# Patient Record
Sex: FEMALE | Race: WHITE | NOT HISPANIC OR LATINO | Employment: OTHER | URBAN - NONMETROPOLITAN AREA
[De-identification: names, ages, dates, MRNs, and addresses within clinical notes are randomized per-mention and may not be internally consistent; named-entity substitution may affect disease eponyms.]

---

## 2017-09-21 ENCOUNTER — TRANSCRIBE ORDERS (OUTPATIENT)
Dept: ADMINISTRATIVE | Facility: HOSPITAL | Age: 62
End: 2017-09-21

## 2017-09-21 DIAGNOSIS — N63.0 LUMP OR MASS IN BREAST: Primary | ICD-10-CM

## 2017-09-25 ENCOUNTER — HOSPITAL ENCOUNTER (OUTPATIENT)
Dept: ULTRASOUND IMAGING | Facility: HOSPITAL | Age: 62
Discharge: HOME | End: 2017-09-25

## 2017-09-25 ENCOUNTER — HOSPITAL ENCOUNTER (OUTPATIENT)
Dept: MAMMOGRAPHY | Facility: HOSPITAL | Age: 62
Discharge: HOME | End: 2017-09-25
Admitting: NURSE PRACTITIONER

## 2017-09-25 DIAGNOSIS — N63.0 LUMP OR MASS IN BREAST: ICD-10-CM

## 2017-09-25 PROCEDURE — 77061 BREAST TOMOSYNTHESIS UNI: CPT | Performed by: RADIOLOGY

## 2017-09-25 PROCEDURE — 76642 ULTRASOUND BREAST LIMITED: CPT

## 2017-09-25 PROCEDURE — G0204 DX MAMMO INCL CAD BI: HCPCS

## 2017-09-25 PROCEDURE — G0279 TOMOSYNTHESIS, MAMMO: HCPCS

## 2017-09-25 PROCEDURE — 76642 ULTRASOUND BREAST LIMITED: CPT | Performed by: RADIOLOGY

## 2017-09-25 PROCEDURE — G0206 DX MAMMO INCL CAD UNI: HCPCS

## 2017-09-25 PROCEDURE — 77065 DX MAMMO INCL CAD UNI: CPT | Performed by: RADIOLOGY

## 2019-03-07 ENCOUNTER — NURSE TRIAGE (OUTPATIENT)
Dept: CALL CENTER | Facility: HOSPITAL | Age: 64
End: 2019-03-07

## 2019-03-07 ENCOUNTER — HOSPITAL ENCOUNTER (EMERGENCY)
Facility: HOSPITAL | Age: 64
Discharge: HOME | End: 2019-03-07
Attending: EMERGENCY MEDICINE | Admitting: EMERGENCY MEDICINE

## 2019-03-07 DIAGNOSIS — G44.209 ACUTE NON INTRACTABLE TENSION-TYPE HEADACHE: ICD-10-CM

## 2019-03-07 DIAGNOSIS — K86.1 OTHER CHRONIC PANCREATITIS (HCC): Primary | ICD-10-CM

## 2019-03-07 LAB
ALBUMIN SERPL-MCNC: 4.4 G/DL (ref 3.4–4.8)
ALBUMIN/GLOB SERPL: 1.6 G/DL (ref 1.5–2.5)
ALP SERPL-CCNC: 78 U/L (ref 35–104)
ALT SERPL W P-5'-P-CCNC: 17 U/L (ref 10–36)
ANION GAP SERPL CALCULATED.3IONS-SCNC: 5.7 MMOL/L (ref 3.6–11.2)
AST SERPL-CCNC: 26 U/L (ref 10–30)
BACTERIA UR QL AUTO: ABNORMAL /HPF
BASOPHILS # BLD AUTO: 0.05 10*3/MM3 (ref 0–0.3)
BASOPHILS NFR BLD AUTO: 0.8 % (ref 0–2)
BILIRUB SERPL-MCNC: 0.5 MG/DL (ref 0.2–1.8)
BILIRUB UR QL STRIP: NEGATIVE
BUN BLD-MCNC: 10 MG/DL (ref 7–21)
BUN/CREAT SERPL: 8.9 (ref 7–25)
CALCIUM SPEC-SCNC: 9.3 MG/DL (ref 7.7–10)
CHLORIDE SERPL-SCNC: 109 MMOL/L (ref 99–112)
CLARITY UR: CLEAR
CO2 SERPL-SCNC: 22.3 MMOL/L (ref 24.3–31.9)
COLOR UR: YELLOW
CREAT BLD-MCNC: 1.12 MG/DL (ref 0.43–1.29)
DEPRECATED RDW RBC AUTO: 42.4 FL (ref 37–54)
EOSINOPHIL # BLD AUTO: 0.36 10*3/MM3 (ref 0–0.7)
EOSINOPHIL NFR BLD AUTO: 5.5 % (ref 0–5)
ERYTHROCYTE [DISTWIDTH] IN BLOOD BY AUTOMATED COUNT: 12.8 % (ref 11.5–14.5)
GFR SERPL CREATININE-BSD FRML MDRD: 49 ML/MIN/1.73
GLOBULIN UR ELPH-MCNC: 2.7 GM/DL
GLUCOSE BLD-MCNC: 86 MG/DL (ref 70–110)
GLUCOSE UR STRIP-MCNC: NEGATIVE MG/DL
HCT VFR BLD AUTO: 41.6 % (ref 37–47)
HGB BLD-MCNC: 14 G/DL (ref 12–16)
HGB UR QL STRIP.AUTO: ABNORMAL
HOLD SPECIMEN: NORMAL
HOLD SPECIMEN: NORMAL
HYALINE CASTS UR QL AUTO: ABNORMAL /LPF
IMM GRANULOCYTES # BLD AUTO: 0.01 10*3/MM3 (ref 0–0.03)
IMM GRANULOCYTES NFR BLD AUTO: 0.2 % (ref 0–0.5)
KETONES UR QL STRIP: NEGATIVE
LEUKOCYTE ESTERASE UR QL STRIP.AUTO: NEGATIVE
LIPASE SERPL-CCNC: 66 U/L (ref 13–60)
LYMPHOCYTES # BLD AUTO: 2.64 10*3/MM3 (ref 1–3)
LYMPHOCYTES NFR BLD AUTO: 40.4 % (ref 21–51)
MCH RBC QN AUTO: 30.7 PG (ref 27–33)
MCHC RBC AUTO-ENTMCNC: 33.7 G/DL (ref 33–37)
MCV RBC AUTO: 91.2 FL (ref 80–94)
MONOCYTES # BLD AUTO: 0.4 10*3/MM3 (ref 0.1–0.9)
MONOCYTES NFR BLD AUTO: 6.1 % (ref 0–10)
NEUTROPHILS # BLD AUTO: 3.08 10*3/MM3 (ref 1.4–6.5)
NEUTROPHILS NFR BLD AUTO: 47 % (ref 30–70)
NITRITE UR QL STRIP: NEGATIVE
OSMOLALITY SERPL CALC.SUM OF ELEC: 272.2 MOSM/KG (ref 273–305)
PH UR STRIP.AUTO: 6.5 [PH] (ref 5–8)
PLATELET # BLD AUTO: 294 10*3/MM3 (ref 130–400)
PMV BLD AUTO: 10.5 FL (ref 6–10)
POTASSIUM BLD-SCNC: 4.7 MMOL/L (ref 3.5–5.3)
PROT SERPL-MCNC: 7.1 G/DL (ref 6–8)
PROT UR QL STRIP: NEGATIVE
RBC # BLD AUTO: 4.56 10*6/MM3 (ref 4.2–5.4)
RBC # UR: ABNORMAL /HPF
REF LAB TEST METHOD: ABNORMAL
SODIUM BLD-SCNC: 137 MMOL/L (ref 135–153)
SP GR UR STRIP: <=1.005 (ref 1–1.03)
SQUAMOUS #/AREA URNS HPF: ABNORMAL /HPF
UROBILINOGEN UR QL STRIP: ABNORMAL
WBC NRBC COR # BLD: 6.54 10*3/MM3 (ref 4.5–12.5)
WBC UR QL AUTO: ABNORMAL /HPF
WHOLE BLOOD HOLD SPECIMEN: NORMAL
WHOLE BLOOD HOLD SPECIMEN: NORMAL

## 2019-03-07 PROCEDURE — 36415 COLL VENOUS BLD VENIPUNCTURE: CPT

## 2019-03-07 PROCEDURE — 96361 HYDRATE IV INFUSION ADD-ON: CPT

## 2019-03-07 PROCEDURE — 99283 EMERGENCY DEPT VISIT LOW MDM: CPT

## 2019-03-07 PROCEDURE — 80053 COMPREHEN METABOLIC PANEL: CPT | Performed by: EMERGENCY MEDICINE

## 2019-03-07 PROCEDURE — 96375 TX/PRO/DX INJ NEW DRUG ADDON: CPT

## 2019-03-07 PROCEDURE — 81001 URINALYSIS AUTO W/SCOPE: CPT | Performed by: EMERGENCY MEDICINE

## 2019-03-07 PROCEDURE — 25010000002 PROCHLORPERAZINE EDISYLATE PER 10 MG: Performed by: EMERGENCY MEDICINE

## 2019-03-07 PROCEDURE — 96374 THER/PROPH/DIAG INJ IV PUSH: CPT

## 2019-03-07 PROCEDURE — 83690 ASSAY OF LIPASE: CPT | Performed by: EMERGENCY MEDICINE

## 2019-03-07 PROCEDURE — 85025 COMPLETE CBC W/AUTO DIFF WBC: CPT | Performed by: EMERGENCY MEDICINE

## 2019-03-07 PROCEDURE — 25010000002 BUTORPHANOL PER 1 MG: Performed by: EMERGENCY MEDICINE

## 2019-03-07 RX ORDER — SODIUM CHLORIDE 0.9 % (FLUSH) 0.9 %
10 SYRINGE (ML) INJECTION AS NEEDED
Status: DISCONTINUED | OUTPATIENT
Start: 2019-03-07 | End: 2019-03-07 | Stop reason: HOSPADM

## 2019-03-07 RX ORDER — OXYCODONE HYDROCHLORIDE AND ACETAMINOPHEN 5; 325 MG/1; MG/1
1 TABLET ORAL ONCE
Status: COMPLETED | OUTPATIENT
Start: 2019-03-07 | End: 2019-03-07

## 2019-03-07 RX ADMIN — OXYCODONE HYDROCHLORIDE AND ACETAMINOPHEN 1 TABLET: 5; 325 TABLET ORAL at 15:15

## 2019-03-07 RX ADMIN — PROCHLORPERAZINE EDISYLATE 10 MG: 5 INJECTION INTRAMUSCULAR; INTRAVENOUS at 14:05

## 2019-03-07 RX ADMIN — BUTORPHANOL TARTRATE 1 MG: 2 INJECTION, SOLUTION INTRAMUSCULAR; INTRAVENOUS at 14:07

## 2019-03-07 RX ADMIN — SODIUM CHLORIDE 1000 ML: 9 INJECTION, SOLUTION INTRAVENOUS at 13:48

## 2019-03-07 NOTE — TELEPHONE ENCOUNTER
" Patient is having  Right temporal pain, blurring vision, but has cataracts, has not taken bp medication this morning, her bp is 175/103, does not want to go to the ED.  Has not taken her bp medication, advise to take medication and will call the patient back with in an hour 1050 called the patient back her bp now is 172/148, suggested she go to  The ED again, will now comply    Reason for Disposition  • [1] Systolic BP  >= 160 OR Diastolic >= 100 AND [2] cardiac or neurologic symptoms (e.g., chest pain, difficulty breathing, unsteady gait, blurred vision)    Additional Information  • Negative: Difficult to awaken or acting confused (e.g., disoriented, slurred speech)  • Negative: Severe difficulty breathing (e.g., struggling for each breath, speaks in single words)  • Negative: [1] Weakness of the face, arm or leg on one side of the body AND [2] new onset  • Negative: [1] Numbness (i.e., loss of sensation) of the face, arm or leg on one side of the body AND [2] new onset  • Negative: [1] Chest pain lasts > 5 minutes AND [2] history of heart disease  (i.e., heart attack, bypass surgery, angina, angioplasty, CHF)  • Negative: [1] Chest pain AND [2] took nitrogylcerin AND [3] pain was not relieved  • Negative: Sounds like a life-threatening emergency to the triager  • Negative: Symptom is main concern  (e.g., headache, chest pain)  • Negative: Low blood pressure is main concern  • Negative: [1] Pregnant > 20 weeks AND [2] new hand or face swelling  • Negative: [1] Pregnant > 20 weeks AND [2] BP Systolic BP  >= 140 OR Diastolic >= 90  • Negative: [1] Systolic BP  >= 200 OR Diastolic >= 120  AND [2] having NO cardiac or neurologic symptoms    Answer Assessment - Initial Assessment Questions  1. BLOOD PRESSURE: \"What is the blood pressure?\" \"Did you take at least two measurements 5 minutes apart?\"      175/103  2. ONSET: \"When did you take your blood pressure?\"     Just now at the time of the call  3. HOW: \"How did you " "obtain the blood pressure?\" (e.g., visiting nurse, automatic home BP monitor)      Automatic home bp  4. HISTORY: \"Do you have a history of high blood pressure?\"      yes  5. MEDICATIONS: \"Are you taking any medications for blood pressure?\" \"Have you missed any doses recently?\"      yes  6. OTHER SYMPTOMS: \"Do you have any symptoms?\" (e.g., headache, chest pain, blurred vision, difficulty breathing, weakness)      Blurred vision but has cataracts and has pain in temporal   7. PREGNANCY: \"Is there any chance you are pregnant?\" \"When was your last menstrual period?\"      na    Protocols used: HIGH BLOOD PRESSURE-ADULT-AH      "

## 2019-03-07 NOTE — ED PROVIDER NOTES
Subjective   Patient with history of chronic pancreatitis is been having increasing pain over the last few days.  She is now belching nausea vomiting and unable to keep her meds down.  She usually controls it with narcotics and antiemetics.  She is also developed a right-sided temporal headache sharp pain  is tender to touch.        History provided by:  Patient   used: No    Abdominal Pain   Pain location:  Epigastric  Pain quality: sharp and stabbing    Pain radiates to:  Back  Pain severity:  Severe  Onset quality:  Gradual  Duration:  2 days  Timing:  Constant  Progression:  Worsening  Chronicity:  Chronic  Context: recent illness    Relieved by:  Nothing  Worsened by:  Eating  Ineffective treatments: Pain meds and antiemetics.  Associated symptoms: nausea and vomiting    Associated symptoms: no chest pain, no dysuria and no fever    Associated symptoms comment:  Right-sided headache      Review of Systems   Constitutional: Negative.  Negative for fever.   HENT: Negative.    Respiratory: Negative.    Cardiovascular: Negative.  Negative for chest pain.   Gastrointestinal: Positive for abdominal pain, nausea and vomiting.   Endocrine: Negative.    Genitourinary: Negative.  Negative for dysuria.   Skin: Negative.    Neurological: Negative.    Psychiatric/Behavioral: Negative.    All other systems reviewed and are negative.      Past Medical History:   Diagnosis Date   • Breast cancer (CMS/[facility]) 2003       Allergies   Allergen Reactions   • Penicillins Anaphylaxis   • Sulfa Antibiotics Rash       Past Surgical History:   Procedure Laterality Date   • HYSTERECTOMY     • MASTECTOMY Left 2003       Family History   Problem Relation Age of Onset   • Breast cancer Mother    • Breast cancer Maternal Grandmother    • Breast cancer Maternal Aunt    • Breast cancer Paternal Aunt    • Breast cancer Maternal Aunt    • Breast cancer Maternal Aunt        Social History     Socioeconomic History   • Marital  status:      Spouse name: Not on file   • Number of children: Not on file   • Years of education: Not on file   • Highest education level: Not on file           Objective   Physical Exam   Constitutional: She is oriented to person, place, and time. She appears well-developed and well-nourished. No distress.   HENT:   Head: Normocephalic and atraumatic.       Right Ear: External ear normal.   Left Ear: External ear normal.   Nose: Nose normal.   Eyes: Conjunctivae and EOM are normal. Pupils are equal, round, and reactive to light.   Neck: Normal range of motion. Neck supple. No JVD present. No tracheal deviation present.   Cardiovascular: Normal rate, regular rhythm and normal heart sounds.   No murmur heard.  Pulmonary/Chest: Effort normal and breath sounds normal. No respiratory distress. She has no wheezes.   Abdominal: Soft. Bowel sounds are normal. There is tenderness in the epigastric area.   Musculoskeletal: Normal range of motion. She exhibits no edema or deformity.   Neurological: She is alert and oriented to person, place, and time. No cranial nerve deficit.   Skin: Skin is warm and dry. No rash noted. She is not diaphoretic. No erythema. No pallor.   Psychiatric: She has a normal mood and affect. Her behavior is normal. Thought content normal.   Nursing note and vitals reviewed.      Procedures           ED Course      Headache improved nausea gone still has low back pain.            MDM      Final diagnoses:   Other chronic pancreatitis (CMS/HCC)   Acute non intractable tension-type headache            Zeke Dunlap MD  03/07/19 3656

## 2019-03-09 VITALS
HEIGHT: 66 IN | TEMPERATURE: 97.3 F | RESPIRATION RATE: 18 BRPM | HEART RATE: 57 BPM | OXYGEN SATURATION: 99 % | DIASTOLIC BLOOD PRESSURE: 97 MMHG | SYSTOLIC BLOOD PRESSURE: 159 MMHG | BODY MASS INDEX: 30.53 KG/M2 | WEIGHT: 190 LBS

## 2019-04-16 ENCOUNTER — TRANSCRIBE ORDERS (OUTPATIENT)
Dept: ADMINISTRATIVE | Facility: HOSPITAL | Age: 64
End: 2019-04-16

## 2019-04-16 DIAGNOSIS — M47.816 LUMBAR SPONDYLOSIS: Primary | ICD-10-CM

## 2019-04-19 ENCOUNTER — HOSPITAL ENCOUNTER (OUTPATIENT)
Dept: MRI IMAGING | Facility: HOSPITAL | Age: 64
Discharge: HOME | End: 2019-04-19
Admitting: NURSE PRACTITIONER

## 2019-04-19 DIAGNOSIS — M47.816 LUMBAR SPONDYLOSIS: ICD-10-CM

## 2019-04-19 PROCEDURE — 72148 MRI LUMBAR SPINE W/O DYE: CPT

## 2019-04-19 PROCEDURE — 72148 MRI LUMBAR SPINE W/O DYE: CPT | Performed by: RADIOLOGY

## 2020-03-12 ENCOUNTER — APPOINTMENT (OUTPATIENT)
Dept: CT IMAGING | Facility: HOSPITAL | Age: 65
End: 2020-03-12

## 2020-03-12 ENCOUNTER — HOSPITAL ENCOUNTER (EMERGENCY)
Facility: HOSPITAL | Age: 65
Discharge: HOME | End: 2020-03-12
Attending: FAMILY MEDICINE | Admitting: FAMILY MEDICINE

## 2020-03-12 VITALS
SYSTOLIC BLOOD PRESSURE: 119 MMHG | OXYGEN SATURATION: 100 % | DIASTOLIC BLOOD PRESSURE: 66 MMHG | HEIGHT: 66 IN | HEART RATE: 66 BPM | BODY MASS INDEX: 28.77 KG/M2 | WEIGHT: 179 LBS | TEMPERATURE: 97.6 F | RESPIRATION RATE: 18 BRPM

## 2020-03-12 DIAGNOSIS — N39.0 UTI (URINARY TRACT INFECTION), UNCOMPLICATED: Primary | ICD-10-CM

## 2020-03-12 DIAGNOSIS — R10.84 GENERALIZED ABDOMINAL PAIN: ICD-10-CM

## 2020-03-12 DIAGNOSIS — R10.9 LEFT FLANK PAIN: ICD-10-CM

## 2020-03-12 LAB
ALBUMIN SERPL-MCNC: 4.55 G/DL (ref 3.5–5.2)
ALBUMIN/GLOB SERPL: 1.5 G/DL
ALP SERPL-CCNC: 101 U/L (ref 39–117)
ALT SERPL W P-5'-P-CCNC: 10 U/L (ref 1–33)
ANION GAP SERPL CALCULATED.3IONS-SCNC: 13.2 MMOL/L (ref 5–15)
AST SERPL-CCNC: 13 U/L (ref 1–32)
BACTERIA UR QL AUTO: ABNORMAL /HPF
BASOPHILS # BLD AUTO: 0.08 10*3/MM3 (ref 0–0.2)
BASOPHILS NFR BLD AUTO: 1 % (ref 0–1.5)
BILIRUB SERPL-MCNC: 0.2 MG/DL (ref 0.2–1.2)
BILIRUB UR QL STRIP: NEGATIVE
BUN BLD-MCNC: 12 MG/DL (ref 8–23)
BUN/CREAT SERPL: 11.5 (ref 7–25)
CALCIUM SPEC-SCNC: 9.4 MG/DL (ref 8.6–10.5)
CHLORIDE SERPL-SCNC: 101 MMOL/L (ref 98–107)
CLARITY UR: CLEAR
CO2 SERPL-SCNC: 27.8 MMOL/L (ref 22–29)
COLOR UR: YELLOW
CREAT BLD-MCNC: 1.04 MG/DL (ref 0.57–1)
CRP SERPL-MCNC: 0.56 MG/DL (ref 0–0.5)
DEPRECATED RDW RBC AUTO: 44.4 FL (ref 37–54)
EOSINOPHIL # BLD AUTO: 0.31 10*3/MM3 (ref 0–0.4)
EOSINOPHIL NFR BLD AUTO: 4 % (ref 0.3–6.2)
ERYTHROCYTE [DISTWIDTH] IN BLOOD BY AUTOMATED COUNT: 13 % (ref 12.3–15.4)
GFR SERPL CREATININE-BSD FRML MDRD: 53 ML/MIN/1.73
GLOBULIN UR ELPH-MCNC: 3 GM/DL
GLUCOSE BLD-MCNC: 107 MG/DL (ref 65–99)
GLUCOSE UR STRIP-MCNC: NEGATIVE MG/DL
HCT VFR BLD AUTO: 43.9 % (ref 34–46.6)
HGB BLD-MCNC: 14.2 G/DL (ref 12–15.9)
HGB UR QL STRIP.AUTO: ABNORMAL
HOLD SPECIMEN: NORMAL
HOLD SPECIMEN: NORMAL
HYALINE CASTS UR QL AUTO: ABNORMAL /LPF
IMM GRANULOCYTES # BLD AUTO: 0.02 10*3/MM3 (ref 0–0.05)
IMM GRANULOCYTES NFR BLD AUTO: 0.3 % (ref 0–0.5)
KETONES UR QL STRIP: NEGATIVE
LEUKOCYTE ESTERASE UR QL STRIP.AUTO: ABNORMAL
LIPASE SERPL-CCNC: 17 U/L (ref 13–60)
LYMPHOCYTES # BLD AUTO: 2.48 10*3/MM3 (ref 0.7–3.1)
LYMPHOCYTES NFR BLD AUTO: 32 % (ref 19.6–45.3)
MCH RBC QN AUTO: 30.3 PG (ref 26.6–33)
MCHC RBC AUTO-ENTMCNC: 32.3 G/DL (ref 31.5–35.7)
MCV RBC AUTO: 93.6 FL (ref 79–97)
MONOCYTES # BLD AUTO: 0.45 10*3/MM3 (ref 0.1–0.9)
MONOCYTES NFR BLD AUTO: 5.8 % (ref 5–12)
NEUTROPHILS # BLD AUTO: 4.4 10*3/MM3 (ref 1.7–7)
NEUTROPHILS NFR BLD AUTO: 56.9 % (ref 42.7–76)
NITRITE UR QL STRIP: NEGATIVE
NRBC BLD AUTO-RTO: 0 /100 WBC (ref 0–0.2)
PH UR STRIP.AUTO: 6.5 [PH] (ref 5–8)
PLATELET # BLD AUTO: 321 10*3/MM3 (ref 140–450)
PMV BLD AUTO: 9.8 FL (ref 6–12)
POTASSIUM BLD-SCNC: 3.8 MMOL/L (ref 3.5–5.2)
PROT SERPL-MCNC: 7.5 G/DL (ref 6–8.5)
PROT UR QL STRIP: NEGATIVE
RBC # BLD AUTO: 4.69 10*6/MM3 (ref 3.77–5.28)
RBC # UR: ABNORMAL /HPF
REF LAB TEST METHOD: ABNORMAL
SODIUM BLD-SCNC: 142 MMOL/L (ref 136–145)
SP GR UR STRIP: <=1.005 (ref 1–1.03)
SQUAMOUS #/AREA URNS HPF: ABNORMAL /HPF
UROBILINOGEN UR QL STRIP: ABNORMAL
WBC NRBC COR # BLD: 7.74 10*3/MM3 (ref 3.4–10.8)
WBC UR QL AUTO: ABNORMAL /HPF
WHOLE BLOOD HOLD SPECIMEN: NORMAL
WHOLE BLOOD HOLD SPECIMEN: NORMAL

## 2020-03-12 PROCEDURE — 85025 COMPLETE CBC W/AUTO DIFF WBC: CPT | Performed by: PHYSICIAN ASSISTANT

## 2020-03-12 PROCEDURE — 74178 CT ABD&PLV WO CNTR FLWD CNTR: CPT | Performed by: RADIOLOGY

## 2020-03-12 PROCEDURE — 86140 C-REACTIVE PROTEIN: CPT | Performed by: PHYSICIAN ASSISTANT

## 2020-03-12 PROCEDURE — 74178 CT ABD&PLV WO CNTR FLWD CNTR: CPT

## 2020-03-12 PROCEDURE — 87086 URINE CULTURE/COLONY COUNT: CPT | Performed by: PHYSICIAN ASSISTANT

## 2020-03-12 PROCEDURE — 25010000002 CEFTRIAXONE: Performed by: PHYSICIAN ASSISTANT

## 2020-03-12 PROCEDURE — 99283 EMERGENCY DEPT VISIT LOW MDM: CPT

## 2020-03-12 PROCEDURE — 80053 COMPREHEN METABOLIC PANEL: CPT | Performed by: PHYSICIAN ASSISTANT

## 2020-03-12 PROCEDURE — 25010000002 IOPAMIDOL 61 % SOLUTION: Performed by: FAMILY MEDICINE

## 2020-03-12 PROCEDURE — 25010000002 KETOROLAC TROMETHAMINE PER 15 MG: Performed by: FAMILY MEDICINE

## 2020-03-12 PROCEDURE — 25010000002 HYDROMORPHONE 1 MG/ML SOLUTION: Performed by: FAMILY MEDICINE

## 2020-03-12 PROCEDURE — 25010000002 HYDROMORPHONE PER 4 MG: Performed by: FAMILY MEDICINE

## 2020-03-12 PROCEDURE — 81001 URINALYSIS AUTO W/SCOPE: CPT | Performed by: PHYSICIAN ASSISTANT

## 2020-03-12 PROCEDURE — 25010000002 ONDANSETRON PER 1 MG: Performed by: FAMILY MEDICINE

## 2020-03-12 PROCEDURE — 96375 TX/PRO/DX INJ NEW DRUG ADDON: CPT

## 2020-03-12 PROCEDURE — 96376 TX/PRO/DX INJ SAME DRUG ADON: CPT

## 2020-03-12 PROCEDURE — 96365 THER/PROPH/DIAG IV INF INIT: CPT

## 2020-03-12 PROCEDURE — 83690 ASSAY OF LIPASE: CPT | Performed by: PHYSICIAN ASSISTANT

## 2020-03-12 RX ORDER — ONDANSETRON 2 MG/ML
4 INJECTION INTRAMUSCULAR; INTRAVENOUS ONCE
Status: COMPLETED | OUTPATIENT
Start: 2020-03-12 | End: 2020-03-12

## 2020-03-12 RX ORDER — HYDROMORPHONE HYDROCHLORIDE 1 MG/ML
0.5 INJECTION, SOLUTION INTRAMUSCULAR; INTRAVENOUS; SUBCUTANEOUS ONCE
Status: COMPLETED | OUTPATIENT
Start: 2020-03-12 | End: 2020-03-12

## 2020-03-12 RX ORDER — NITROFURANTOIN 25; 75 MG/1; MG/1
100 CAPSULE ORAL 2 TIMES DAILY
Qty: 14 CAPSULE | Refills: 0 | Status: ON HOLD | OUTPATIENT
Start: 2020-03-12 | End: 2021-12-20

## 2020-03-12 RX ORDER — SODIUM CHLORIDE 0.9 % (FLUSH) 0.9 %
10 SYRINGE (ML) INJECTION AS NEEDED
Status: DISCONTINUED | OUTPATIENT
Start: 2020-03-12 | End: 2020-03-12 | Stop reason: HOSPADM

## 2020-03-12 RX ORDER — KETOROLAC TROMETHAMINE 30 MG/ML
15 INJECTION, SOLUTION INTRAMUSCULAR; INTRAVENOUS ONCE
Status: COMPLETED | OUTPATIENT
Start: 2020-03-12 | End: 2020-03-12

## 2020-03-12 RX ADMIN — HYDROMORPHONE HYDROCHLORIDE 1 MG: 1 INJECTION, SOLUTION INTRAMUSCULAR; INTRAVENOUS; SUBCUTANEOUS at 16:23

## 2020-03-12 RX ADMIN — CEFTRIAXONE 1 G: 1 INJECTION, POWDER, FOR SOLUTION INTRAMUSCULAR; INTRAVENOUS at 15:57

## 2020-03-12 RX ADMIN — HYDROMORPHONE HYDROCHLORIDE 0.5 MG: 1 INJECTION, SOLUTION INTRAMUSCULAR; INTRAVENOUS; SUBCUTANEOUS at 14:39

## 2020-03-12 RX ADMIN — IOPAMIDOL 100 ML: 612 INJECTION, SOLUTION INTRAVENOUS at 15:46

## 2020-03-12 RX ADMIN — KETOROLAC TROMETHAMINE 15 MG: 30 INJECTION, SOLUTION INTRAMUSCULAR at 16:22

## 2020-03-12 RX ADMIN — SODIUM CHLORIDE 1000 ML: 9 INJECTION, SOLUTION INTRAVENOUS at 14:38

## 2020-03-12 RX ADMIN — ONDANSETRON 4 MG: 2 INJECTION INTRAMUSCULAR; INTRAVENOUS at 14:39

## 2020-03-12 NOTE — ED PROVIDER NOTES
Subjective   64-year-old female with past medical history of breast cancer and pancreatitis presents to the emergency room with abdominal pain for 1-1/2 weeks duration.  Patient states pain radiates to her left flank.  She does report nausea without vomiting and dysuria.  Denies fever, chest pain, fever, or hematuria.  Patient denies any alleviating factors despite taking Norco 10mg 3 times a day and tramadol.  Patient states aggravating factors include urinating and movement.  She does state that she has a history of pancreatitis, but denies history of kidney stones.  She reports that she has had multiple abdominal surgeries, she thinks approximately 17.  Patient reports she just got off a round of ciprofloxacin for urinary tract infection as prescribed by her PCP.      History provided by:  Patient   used: No        Review of Systems   Constitutional: Negative.  Negative for fever.   HENT: Negative.    Respiratory: Negative.    Cardiovascular: Negative.  Negative for chest pain.   Gastrointestinal: Positive for nausea. Negative for abdominal pain.   Endocrine: Negative.    Genitourinary: Positive for dysuria and flank pain.        Burning   Skin: Negative.    Neurological: Negative.    Psychiatric/Behavioral: Negative.    All other systems reviewed and are negative.      Past Medical History:   Diagnosis Date   • Breast cancer (CMS/[facility]) 2003       Allergies   Allergen Reactions   • Penicillins Anaphylaxis   • Sulfa Antibiotics Rash       Past Surgical History:   Procedure Laterality Date   • HYSTERECTOMY     • MASTECTOMY Left 2003       Family History   Problem Relation Age of Onset   • Breast cancer Mother    • Breast cancer Maternal Grandmother    • Breast cancer Maternal Aunt    • Breast cancer Paternal Aunt    • Breast cancer Maternal Aunt    • Breast cancer Maternal Aunt        Social History     Socioeconomic History   • Marital status:      Spouse name: Not on file   • Number of  children: Not on file   • Years of education: Not on file   • Highest education level: Not on file           Objective   Physical Exam   Constitutional: She is oriented to person, place, and time. She appears well-developed and well-nourished. No distress.   HENT:   Head: Normocephalic and atraumatic.   Right Ear: External ear normal.   Left Ear: External ear normal.   Nose: Nose normal.   Eyes: Pupils are equal, round, and reactive to light. Conjunctivae and EOM are normal.   Neck: Normal range of motion. Neck supple. No JVD present. No tracheal deviation present.   Cardiovascular: Normal rate, regular rhythm and normal heart sounds.   No murmur heard.  Pulmonary/Chest: Effort normal and breath sounds normal. No respiratory distress. She has no wheezes.   Abdominal: Soft. Bowel sounds are normal. There is generalized tenderness. There is CVA tenderness.   Left CVAT     Musculoskeletal: Normal range of motion. She exhibits no edema or deformity.   Neurological: She is alert and oriented to person, place, and time. No cranial nerve deficit.   Skin: Skin is warm and dry. No rash noted. She is not diaphoretic. No erythema. No pallor.   Psychiatric: She has a normal mood and affect. Her behavior is normal. Thought content normal.   Nursing note and vitals reviewed.      Procedures           ED Course  ED Course as of Mar 12 1656   Thu Mar 12, 2020   1615 IMPRESSION:   : No definitive source for the patient's symptoms.    This report was finalized on 3/12/2020 3:53 PM by Dr. Snow    CT Abdomen Pelvis With & Without Contrast [TK]      ED Course User Index  [TK] Aristides Stanley, ITZEL                                           MDM  Number of Diagnoses or Management Options  Generalized abdominal pain: new and requires workup  Left flank pain: new and requires workup  UTI (urinary tract infection), uncomplicated: new and requires workup     Amount and/or Complexity of Data Reviewed  Clinical lab tests: reviewed and  ordered  Tests in the radiology section of CPT®: reviewed and ordered    Risk of Complications, Morbidity, and/or Mortality  Presenting problems: moderate  Diagnostic procedures: moderate  Management options: moderate    Patient Progress  Patient progress: stable      Final diagnoses:   Generalized abdominal pain   Left flank pain   UTI (urinary tract infection), uncomplicated            Aristides Stanley, ITZEL  03/12/20 1651

## 2020-03-13 LAB — BACTERIA SPEC AEROBE CULT: NO GROWTH

## 2021-02-22 DIAGNOSIS — Z23 IMMUNIZATION DUE: ICD-10-CM

## 2021-02-24 ENCOUNTER — HOSPITAL ENCOUNTER (EMERGENCY)
Facility: HOSPITAL | Age: 66
Discharge: LEFT BEFORE DISCHARGE | End: 2021-02-24
Attending: FAMILY MEDICINE | Admitting: FAMILY MEDICINE

## 2021-02-24 VITALS
WEIGHT: 180 LBS | BODY MASS INDEX: 28.93 KG/M2 | DIASTOLIC BLOOD PRESSURE: 78 MMHG | OXYGEN SATURATION: 97 % | TEMPERATURE: 97 F | SYSTOLIC BLOOD PRESSURE: 127 MMHG | HEIGHT: 66 IN | HEART RATE: 77 BPM | RESPIRATION RATE: 18 BRPM

## 2021-02-24 DIAGNOSIS — R11.2 NAUSEA AND VOMITING, INTRACTABILITY OF VOMITING NOT SPECIFIED, UNSPECIFIED VOMITING TYPE: Primary | ICD-10-CM

## 2021-02-24 LAB
ALBUMIN SERPL-MCNC: 4.29 G/DL (ref 3.5–5.2)
ALBUMIN/GLOB SERPL: 1.5 G/DL
ALP SERPL-CCNC: 89 U/L (ref 39–117)
ALT SERPL W P-5'-P-CCNC: 12 U/L (ref 1–33)
AMYLASE SERPL-CCNC: 48 U/L (ref 28–100)
ANION GAP SERPL CALCULATED.3IONS-SCNC: 10.9 MMOL/L (ref 5–15)
AST SERPL-CCNC: 14 U/L (ref 1–32)
BASOPHILS # BLD AUTO: 0.11 10*3/MM3 (ref 0–0.2)
BASOPHILS NFR BLD AUTO: 1.3 % (ref 0–1.5)
BILIRUB SERPL-MCNC: 0.4 MG/DL (ref 0–1.2)
BUN SERPL-MCNC: 14 MG/DL (ref 8–23)
BUN/CREAT SERPL: 14.9 (ref 7–25)
CALCIUM SPEC-SCNC: 9.6 MG/DL (ref 8.6–10.5)
CHLORIDE SERPL-SCNC: 102 MMOL/L (ref 98–107)
CO2 SERPL-SCNC: 25.1 MMOL/L (ref 22–29)
CREAT SERPL-MCNC: 0.94 MG/DL (ref 0.57–1)
D-LACTATE SERPL-SCNC: 0.9 MMOL/L (ref 0.5–2)
DEPRECATED RDW RBC AUTO: 44.1 FL (ref 37–54)
EOSINOPHIL # BLD AUTO: 0.21 10*3/MM3 (ref 0–0.4)
EOSINOPHIL NFR BLD AUTO: 2.5 % (ref 0.3–6.2)
ERYTHROCYTE [DISTWIDTH] IN BLOOD BY AUTOMATED COUNT: 13 % (ref 12.3–15.4)
GFR SERPL CREATININE-BSD FRML MDRD: 60 ML/MIN/1.73
GLOBULIN UR ELPH-MCNC: 2.9 GM/DL
GLUCOSE SERPL-MCNC: 99 MG/DL (ref 65–99)
HCT VFR BLD AUTO: 39 % (ref 34–46.6)
HGB BLD-MCNC: 13.1 G/DL (ref 12–15.9)
HOLD SPECIMEN: NORMAL
HOLD SPECIMEN: NORMAL
IMM GRANULOCYTES # BLD AUTO: 0.03 10*3/MM3 (ref 0–0.05)
IMM GRANULOCYTES NFR BLD AUTO: 0.4 % (ref 0–0.5)
LIPASE SERPL-CCNC: 21 U/L (ref 13–60)
LYMPHOCYTES # BLD AUTO: 2.68 10*3/MM3 (ref 0.7–3.1)
LYMPHOCYTES NFR BLD AUTO: 32.2 % (ref 19.6–45.3)
MCH RBC QN AUTO: 31 PG (ref 26.6–33)
MCHC RBC AUTO-ENTMCNC: 33.6 G/DL (ref 31.5–35.7)
MCV RBC AUTO: 92.4 FL (ref 79–97)
MONOCYTES # BLD AUTO: 0.5 10*3/MM3 (ref 0.1–0.9)
MONOCYTES NFR BLD AUTO: 6 % (ref 5–12)
NEUTROPHILS NFR BLD AUTO: 4.79 10*3/MM3 (ref 1.7–7)
NEUTROPHILS NFR BLD AUTO: 57.6 % (ref 42.7–76)
NRBC BLD AUTO-RTO: 0 /100 WBC (ref 0–0.2)
PLATELET # BLD AUTO: 350 10*3/MM3 (ref 140–450)
PMV BLD AUTO: 8.9 FL (ref 6–12)
POTASSIUM SERPL-SCNC: 4.2 MMOL/L (ref 3.5–5.2)
PROT SERPL-MCNC: 7.2 G/DL (ref 6–8.5)
RBC # BLD AUTO: 4.22 10*6/MM3 (ref 3.77–5.28)
SODIUM SERPL-SCNC: 138 MMOL/L (ref 136–145)
WBC # BLD AUTO: 8.32 10*3/MM3 (ref 3.4–10.8)
WHOLE BLOOD HOLD SPECIMEN: NORMAL
WHOLE BLOOD HOLD SPECIMEN: NORMAL

## 2021-02-24 PROCEDURE — 83690 ASSAY OF LIPASE: CPT | Performed by: PHYSICIAN ASSISTANT

## 2021-02-24 PROCEDURE — 25010000002 PROCHLORPERAZINE 10 MG/2ML SOLUTION: Performed by: PHYSICIAN ASSISTANT

## 2021-02-24 PROCEDURE — 99283 EMERGENCY DEPT VISIT LOW MDM: CPT

## 2021-02-24 PROCEDURE — 80053 COMPREHEN METABOLIC PANEL: CPT | Performed by: PHYSICIAN ASSISTANT

## 2021-02-24 PROCEDURE — 87040 BLOOD CULTURE FOR BACTERIA: CPT | Performed by: PHYSICIAN ASSISTANT

## 2021-02-24 PROCEDURE — 36415 COLL VENOUS BLD VENIPUNCTURE: CPT

## 2021-02-24 PROCEDURE — 96374 THER/PROPH/DIAG INJ IV PUSH: CPT

## 2021-02-24 PROCEDURE — 25010000002 ONDANSETRON PER 1 MG: Performed by: PHYSICIAN ASSISTANT

## 2021-02-24 PROCEDURE — 82150 ASSAY OF AMYLASE: CPT | Performed by: PHYSICIAN ASSISTANT

## 2021-02-24 PROCEDURE — 99281 EMR DPT VST MAYX REQ PHY/QHP: CPT

## 2021-02-24 PROCEDURE — 85025 COMPLETE CBC W/AUTO DIFF WBC: CPT | Performed by: PHYSICIAN ASSISTANT

## 2021-02-24 PROCEDURE — 83605 ASSAY OF LACTIC ACID: CPT | Performed by: PHYSICIAN ASSISTANT

## 2021-02-24 PROCEDURE — 96375 TX/PRO/DX INJ NEW DRUG ADDON: CPT

## 2021-02-24 RX ORDER — PROCHLORPERAZINE EDISYLATE 5 MG/ML
10 INJECTION INTRAMUSCULAR; INTRAVENOUS ONCE
Status: COMPLETED | OUTPATIENT
Start: 2021-02-24 | End: 2021-02-24

## 2021-02-24 RX ORDER — PROMETHAZINE HYDROCHLORIDE 25 MG/1
25 TABLET ORAL EVERY 6 HOURS PRN
COMMUNITY

## 2021-02-24 RX ORDER — HYDROCODONE BITARTRATE AND ACETAMINOPHEN 10; 325 MG/1; MG/1
1 TABLET ORAL EVERY 8 HOURS PRN
COMMUNITY
End: 2021-10-07

## 2021-02-24 RX ORDER — SODIUM CHLORIDE 0.9 % (FLUSH) 0.9 %
10 SYRINGE (ML) INJECTION AS NEEDED
Status: DISCONTINUED | OUTPATIENT
Start: 2021-02-24 | End: 2021-02-24 | Stop reason: HOSPADM

## 2021-02-24 RX ORDER — OMEPRAZOLE 40 MG/1
40 CAPSULE, DELAYED RELEASE ORAL DAILY
COMMUNITY

## 2021-02-24 RX ORDER — ONDANSETRON 2 MG/ML
4 INJECTION INTRAMUSCULAR; INTRAVENOUS ONCE
Status: COMPLETED | OUTPATIENT
Start: 2021-02-24 | End: 2021-02-24

## 2021-02-24 RX ORDER — DULOXETIN HYDROCHLORIDE 30 MG/1
30 CAPSULE, DELAYED RELEASE ORAL DAILY
COMMUNITY
End: 2021-10-07

## 2021-02-24 RX ORDER — FAMOTIDINE 10 MG/ML
20 INJECTION, SOLUTION INTRAVENOUS ONCE
Status: COMPLETED | OUTPATIENT
Start: 2021-02-24 | End: 2021-02-24

## 2021-02-24 RX ORDER — HYDROCODONE BITARTRATE AND ACETAMINOPHEN 5; 325 MG/1; MG/1
1 TABLET ORAL ONCE
Status: DISCONTINUED | OUTPATIENT
Start: 2021-02-24 | End: 2021-02-24 | Stop reason: HOSPADM

## 2021-02-24 RX ORDER — FLUOXETINE HYDROCHLORIDE 20 MG/1
40 CAPSULE ORAL DAILY
COMMUNITY

## 2021-02-24 RX ADMIN — FAMOTIDINE 20 MG: 10 INJECTION INTRAVENOUS at 14:57

## 2021-02-24 RX ADMIN — PROCHLORPERAZINE EDISYLATE 10 MG: 5 INJECTION INTRAMUSCULAR; INTRAVENOUS at 14:57

## 2021-02-24 RX ADMIN — SODIUM CHLORIDE 1000 ML: 9 INJECTION, SOLUTION INTRAVENOUS at 14:57

## 2021-02-24 RX ADMIN — ONDANSETRON 4 MG: 2 INJECTION INTRAMUSCULAR; INTRAVENOUS at 14:57

## 2021-03-01 LAB
BACTERIA SPEC AEROBE CULT: NORMAL
BACTERIA SPEC AEROBE CULT: NORMAL

## 2021-03-03 ENCOUNTER — TRANSCRIBE ORDERS (OUTPATIENT)
Dept: ADMINISTRATIVE | Facility: HOSPITAL | Age: 66
End: 2021-03-03

## 2021-03-03 DIAGNOSIS — G89.29 CHRONIC BILATERAL LOW BACK PAIN WITH RIGHT-SIDED SCIATICA: Primary | ICD-10-CM

## 2021-03-03 DIAGNOSIS — M54.41 CHRONIC BILATERAL LOW BACK PAIN WITH RIGHT-SIDED SCIATICA: Primary | ICD-10-CM

## 2021-03-05 ENCOUNTER — HOSPITAL ENCOUNTER (OUTPATIENT)
Dept: MRI IMAGING | Facility: HOSPITAL | Age: 66
Discharge: HOME | End: 2021-03-05
Admitting: NURSE PRACTITIONER

## 2021-03-05 DIAGNOSIS — M54.41 CHRONIC BILATERAL LOW BACK PAIN WITH RIGHT-SIDED SCIATICA: ICD-10-CM

## 2021-03-05 DIAGNOSIS — G89.29 CHRONIC BILATERAL LOW BACK PAIN WITH RIGHT-SIDED SCIATICA: ICD-10-CM

## 2021-03-05 PROCEDURE — 72148 MRI LUMBAR SPINE W/O DYE: CPT | Performed by: RADIOLOGY

## 2021-03-05 PROCEDURE — 72148 MRI LUMBAR SPINE W/O DYE: CPT

## 2021-05-28 ENCOUNTER — APPOINTMENT (OUTPATIENT)
Dept: MAMMOGRAPHY | Facility: HOSPITAL | Age: 66
End: 2021-05-28

## 2021-06-16 ENCOUNTER — HOSPITAL ENCOUNTER (OUTPATIENT)
Dept: MAMMOGRAPHY | Facility: HOSPITAL | Age: 66
Discharge: HOME | End: 2021-06-16
Admitting: NURSE PRACTITIONER

## 2021-06-16 DIAGNOSIS — Z12.31 VISIT FOR SCREENING MAMMOGRAM: ICD-10-CM

## 2021-06-16 PROCEDURE — 77063 BREAST TOMOSYNTHESIS BI: CPT

## 2021-06-16 PROCEDURE — 77067 SCR MAMMO BI INCL CAD: CPT | Performed by: RADIOLOGY

## 2021-06-16 PROCEDURE — 77067 SCR MAMMO BI INCL CAD: CPT

## 2021-06-16 PROCEDURE — 77063 BREAST TOMOSYNTHESIS BI: CPT | Performed by: RADIOLOGY

## 2021-07-20 ENCOUNTER — OFFICE VISIT (OUTPATIENT)
Dept: SURGERY | Facility: CLINIC | Age: 66
End: 2021-07-20

## 2021-07-20 VITALS — BODY MASS INDEX: 29.03 KG/M2 | HEIGHT: 67 IN | WEIGHT: 185 LBS

## 2021-07-20 DIAGNOSIS — L60.0 INGROWN TOENAIL OF RIGHT FOOT: Primary | ICD-10-CM

## 2021-07-20 PROCEDURE — 11730 AVULSION NAIL PLATE SIMPLE 1: CPT | Performed by: SURGERY

## 2021-07-20 RX ORDER — AMLODIPINE BESYLATE 5 MG/1
TABLET ORAL
COMMUNITY
Start: 2021-05-11

## 2021-07-20 NOTE — PROGRESS NOTES
Subjective   Sandra Momin is a 65 y.o. female.     Chief Complaint: toenail problem    History of Present Illness She has had problems with her right great toenail for many years and has had it removed several times. It was cauterized once and has a irregular shape. It is causing her more pain recently. It is     The following portions of the patient's history were reviewed and updated as appropriate: current medications, past family history, past medical history, past social history, past surgical history and problem list.    Review of Systems    Objective   Physical Exam irregular shaped right great toenail. It is ingrown on the sides. It was removed with lidocaine and a hemostat    Past Medical History:   Diagnosis Date   • Breast cancer (CMS/HCC)        Family History   Problem Relation Age of Onset   • Breast cancer Mother    • Breast cancer Maternal Grandmother    • Breast cancer Maternal Aunt    • Breast cancer Paternal Aunt    • Breast cancer Maternal Aunt    • Breast cancer Maternal Aunt    • BRCA 1/2 Daughter        Social History     Tobacco Use   • Smoking status: Current Every Day Smoker     Packs/day: 0.50     Types: Cigarettes   Substance Use Topics   • Alcohol use: Not on file   • Drug use: Not on file       Past Surgical History:   Procedure Laterality Date   • BLADDER REPAIR W/  SECTION     • FOOT SURGERY Bilateral    • GALLBLADDER SURGERY     • HERNIA REPAIR     • HYSTERECTOMY     • MASTECTOMY Left    • REPLACEMENT TOTAL KNEE         Current Outpatient Medications   Medication Instructions   • amLODIPine (NORVASC) 5 MG tablet TAKE 1 (ONE) TABLET AT BEDTIME FOR BLOOD PRESSURE   • DULoxetine (CYMBALTA) 30 mg, Oral, Daily   • FLUoxetine (PROZAC) 40 mg, Oral, Daily   • Galcanezumab-gnlm 100 mg, Subcutaneous, Every 30 Days   • HYDROcodone-acetaminophen (NORCO)  MG per tablet 1 tablet, Oral, Every 8 Hours PRN   • nitrofurantoin (macrocrystal-monohydrate) (MACROBID) 100 mg, Oral, 2  Times Daily   • omeprazole (PRILOSEC) 40 mg, Oral, Daily   • pancrelipase, Lip-Prot-Amyl, (CREON) 25173-37557 units capsule delayed-release particles capsule 24,000 units of lipase, Oral, 3 Times Daily With Meals   • promethazine (PHENERGAN) 25 mg, Oral, Every 6 Hours PRN         Assessment/Plan   Diagnoses and all orders for this visit:    1. Ingrown toenail of right foot (Primary)    clean and keep covered. Return prn

## 2021-10-07 ENCOUNTER — OFFICE VISIT (OUTPATIENT)
Dept: NEUROSURGERY | Facility: CLINIC | Age: 66
End: 2021-10-07

## 2021-10-07 VITALS
HEIGHT: 66 IN | BODY MASS INDEX: 30.67 KG/M2 | TEMPERATURE: 97.8 F | SYSTOLIC BLOOD PRESSURE: 113 MMHG | WEIGHT: 190.8 LBS | DIASTOLIC BLOOD PRESSURE: 75 MMHG

## 2021-10-07 DIAGNOSIS — G89.29 CHRONIC BILATERAL LOW BACK PAIN, UNSPECIFIED WHETHER SCIATICA PRESENT: Primary | ICD-10-CM

## 2021-10-07 DIAGNOSIS — F32.A ANXIETY AND DEPRESSION: ICD-10-CM

## 2021-10-07 DIAGNOSIS — F41.9 ANXIETY AND DEPRESSION: ICD-10-CM

## 2021-10-07 DIAGNOSIS — M25.50 POLYARTHRALGIA: ICD-10-CM

## 2021-10-07 DIAGNOSIS — M51.36 DDD (DEGENERATIVE DISC DISEASE), LUMBAR: ICD-10-CM

## 2021-10-07 DIAGNOSIS — M54.50 CHRONIC BILATERAL LOW BACK PAIN, UNSPECIFIED WHETHER SCIATICA PRESENT: Primary | ICD-10-CM

## 2021-10-07 DIAGNOSIS — F19.90 SUBSTANCE USE: ICD-10-CM

## 2021-10-07 DIAGNOSIS — G89.4 CHRONIC PAIN SYNDROME: ICD-10-CM

## 2021-10-07 PROBLEM — M51.369 DDD (DEGENERATIVE DISC DISEASE), LUMBAR: Status: ACTIVE | Noted: 2021-10-07

## 2021-10-07 PROCEDURE — 99204 OFFICE O/P NEW MOD 45 MIN: CPT | Performed by: PHYSICIAN ASSISTANT

## 2021-10-07 RX ORDER — CYCLOBENZAPRINE HCL 10 MG
TABLET ORAL
COMMUNITY

## 2021-10-07 RX ORDER — ERGOCALCIFEROL 1.25 MG/1
CAPSULE ORAL
COMMUNITY
Start: 2021-05-18

## 2021-10-07 RX ORDER — CYANOCOBALAMIN 1000 UG/ML
INJECTION, SOLUTION INTRAMUSCULAR; SUBCUTANEOUS
COMMUNITY
Start: 2021-07-22

## 2021-10-07 RX ORDER — BUSPIRONE HYDROCHLORIDE 10 MG/1
10 TABLET ORAL 3 TIMES DAILY
COMMUNITY

## 2021-10-07 NOTE — PROGRESS NOTES
Patient: Sandra Momin  : 1955  Chart #: [id number]    Date of Service: 10/07/2021    Chief Complaint   Patient presents with   • Leg Pain     new pt   • Back Pain   right leg pain to the knee at times.    HPI  65 yo WF with chronic LBP, pain radiates into the right anterior thigh, her low back pain radiates up into the thoracic spine and down into her hips.  She endorses pain all over. she has had multiple injections by different people over the years, she had a spinal cord stimulator placed in  and removed in .  She has seen Dr. DARWIN Sauceda in [place] without improvement, she has recently been with Dr. Duenas here in [place].  None of her injections seems to be helping and she is sent for neurosurgical evaluation.  She has been told by one provider that they thought she had fibromyalgia.    Chronic illnesses:  Chronic low back pain  Chronic hip pain  Chronic leg pain    Past Medical History:   Diagnosis Date   • Anemia    • Breast cancer (HCC)    • Headache    • Hearing loss    • Hyperlipidemia    • Hypertension    • Low back pain        Allergies   Allergen Reactions   • Penicillins Anaphylaxis   • Sulfa Antibiotics Rash         Current Outpatient Medications:   •  amLODIPine (NORVASC) 5 MG tablet, TAKE 1 (ONE) TABLET AT BEDTIME FOR BLOOD PRESSURE, Disp: , Rfl:   •  busPIRone (BUSPAR) 10 MG tablet, Take 10 mg by mouth 3 (Three) Times a Day., Disp: , Rfl:   •  cyanocobalamin 1000 MCG/ML injection, cyanocobalamin (vit B-12) 1,000 mcg/mL injection solution  INJECT 1 MILLILITER BY SUBCUTANEOUS ROUTE EVERY 2 WEEKS FOR ONE MONTH THEN EVERY MONTH, Disp: , Rfl:   •  cyclobenzaprine (FLEXERIL) 10 MG tablet, cyclobenzaprine 10 mg tablet, Disp: , Rfl:   •  DULoxetine (CYMBALTA) 30 MG capsule, Take 30 mg by mouth Daily., Disp: , Rfl:   •  ergocalciferol (ERGOCALCIFEROL) 1.25 MG (11970 UT) capsule, ergocalciferol (vitamin D2) 1,250 mcg (50,000 unit) capsule, Disp: , Rfl:   •  FLUoxetine (PROzac) 20 MG  capsule, Take 40 mg by mouth Daily., Disp: , Rfl:   •  Galcanezumab-gnlm 100 MG/ML solution prefilled syringe, Inject 100 mg under the skin into the appropriate area as directed Every 30 (Thirty) Days., Disp: , Rfl:   •  nitrofurantoin, macrocrystal-monohydrate, (MACROBID) 100 MG capsule, Take 1 capsule by mouth 2 (Two) Times a Day., Disp: 14 capsule, Rfl: 0  •  omeprazole (priLOSEC) 40 MG capsule, Take 40 mg by mouth Daily., Disp: , Rfl:   •  pancrelipase, Lip-Prot-Amyl, (CREON) 42822-16035 units capsule delayed-release particles capsule, Take 24,000 units of lipase by mouth 3 (Three) Times a Day With Meals., Disp: , Rfl:   •  promethazine (PHENERGAN) 25 MG tablet, Take 25 mg by mouth Every 6 (Six) Hours As Needed for Nausea or Vomiting., Disp: , Rfl:   •  HYDROcodone-acetaminophen (NORCO)  MG per tablet, Take 1 tablet by mouth Every 8 (Eight) Hours As Needed for Moderate Pain ., Disp: , Rfl:     Social History     Socioeconomic History   • Marital status:      Spouse name: Not on file   • Number of children: Not on file   • Years of education: Not on file   • Highest education level: Not on file   Tobacco Use   • Smoking status: Current Every Day Smoker     Packs/day: 0.50     Types: Cigarettes   Vaping Use   • Vaping Use: Every day   • Substances: Flavoring   • Devices: Pre-filled or refillable cartridge   Substance and Sexual Activity   • Alcohol use: Not Currently   • Drug use: Not Currently   • Sexual activity: Defer       Family History   Problem Relation Age of Onset   • Breast cancer Mother    • Cancer Mother    • Hypertension Mother    • Breast cancer Maternal Grandmother    • Breast cancer Maternal Aunt    • Breast cancer Paternal Aunt    • Breast cancer Maternal Aunt    • Breast cancer Maternal Aunt    • BRCA 1/2 Daughter    • Heart disease Father    • Hypertension Father        Review of Systems   Constitutional: Positive for activity change, appetite change, fatigue and unexpected weight  change. Negative for chills, diaphoresis and fever.   HENT: Positive for hearing loss. Negative for congestion, dental problem, drooling, ear discharge, ear pain, facial swelling, mouth sores, nosebleeds, postnasal drip, rhinorrhea, sinus pressure, sinus pain, sneezing, sore throat, tinnitus, trouble swallowing and voice change.    Eyes: Positive for itching. Negative for photophobia, pain, discharge, redness and visual disturbance.   Respiratory: Negative for apnea, cough, choking, chest tightness, shortness of breath, wheezing and stridor.    Cardiovascular: Positive for leg swelling. Negative for chest pain and palpitations.   Gastrointestinal: Negative for abdominal distention, abdominal pain, anal bleeding, blood in stool, constipation, diarrhea, nausea, rectal pain and vomiting.   Endocrine: Negative for cold intolerance, heat intolerance, polydipsia, polyphagia and polyuria.   Genitourinary: Negative for decreased urine volume, difficulty urinating, dysuria, enuresis, flank pain, frequency, genital sores, hematuria and urgency.   Musculoskeletal: Positive for back pain and joint swelling. Negative for arthralgias, gait problem, myalgias, neck pain and neck stiffness.   Skin: Negative for color change, pallor, rash and wound.   Allergic/Immunologic: Negative for environmental allergies, food allergies and immunocompromised state.   Neurological: Negative for dizziness, tremors, seizures, syncope, facial asymmetry, speech difficulty, weakness, light-headedness, numbness and headaches.   Hematological: Negative for adenopathy. Does not bruise/bleed easily.   Psychiatric/Behavioral: Negative for agitation, behavioral problems, confusion, decreased concentration, dysphoric mood, hallucinations, self-injury, sleep disturbance and suicidal ideas. The patient is not nervous/anxious and is not hyperactive.        Patient's Body mass index is 30.8 kg/m². indicating that she is obese (BMI >30). Obesity-related health  "conditions include the following: osteoarthritis. Obesity is unchanged. BMI is is above average; BMI management plan is completed.      Social History    Tobacco Use      Smoking status: Current Every Day Smoker        Packs/day: 0.50        Types: Cigarettes       Physical examination:  Blood pressure 113/75, temperature 97.8 °F (36.6 °C), height 167.6 cm (66\"), weight 86.5 kg (190 lb 12.8 oz).  HEENT- normocephalic, atraumatic, sclera clear  Lungs-normal expansion, no wheezing  Heart-regular rate and rhythm  Extremities-positive pulses, no edema    Neurologic Exam    WDWN white female  A/A/C, speech clear, attention normal, conversant, answers questions appropriately, good historian.  Cranial nerves II through XII are intact  Motor examination does not reveal weakness in the , upper or lower extremities.   Sensation is intact.  Gait is essentially normal, balance is normal.   No tremors are noted.  Reflexes are intact.   Palpation of the lower back, hips is exquisitely tender to light touch.  Palpation of other joints in the clavicle upper extremities knees is quite tender.    Radiographic Imaging:  I have personally reviewed imaging and outside results. I have independently interpreted the imaging and discussed with the patient the findings and appropriate management.  Her lumbar MRI scan dated 3/5/2021 shows multilevel degenerative disc disease throughout the lumbar spine with preservation of L5-S1.  There is severe degenerative disc disease at L2-3 with Modic endplate changes and bulging disc associated with bilateral facet hypertrophy and foraminal narrowing worse on the left than the right, at L3-4 she has broad base bulging disc and bilateral facet hypertrophy worse on the left than the right and at L4-5 she has narrowing of the disc space height with bilateral foraminal narrowing.      Medical Decision Making  Assessment and Plan:  1.  Degenerative osteoarthritis throughout the lumbar spine preserving " L5-S1-I have asked the patient to get flexion-extension x-rays of the lumbar spine to evaluate for any instability in her joints.  2.  Chronic low back pain-I think the patient would greatly benefit from the lumbar Kristi program for her chronic low back pain and sciatica.  3.  Chronic pain syndrome-has failed multiple injections, rhizotomies, spinal cord placement in 2019 by Dr. Nicolas and then removed in 2020 by [facility] neurosurgery.  4.  SI joint dysfunction  5.  Tobacco abuse-even a half a pack a day is not beneficial for her multilevel degenerative disc disease.  6.  Polyarthralgia-due to examination findings associated with fibromyalgia should be considered.  7.  I discussed with the patient that her chronic pain syndrome, low back pain, SI joint pain, pain to light touch is difficult to treat as she has found out over the past several years.  I do not currently find a surgical fix for her complex pain.  I will make further recommendations after her EMG nerve studies and flexion-extension x-rays.    It was a pleasure providing neurosurgical evaluation.  I will follow up with the patient after her diagnostic studies are completed.    Marcie Ramirez PA-C    Patient Care Team:  Blanquita Sheppard APRN as PCP - General (Nurse Practitioner)  Jayesh Michele PA-C as Referring Physician (Physician Assistant)  Kelly Montanez PA-C as Referring Physician (Infectious Diseases)    Addedum:      xrays of the lumbar spine shows multilevel DDD and scoliotic curvature.    Marcie Ramirez PA-C

## 2021-10-08 PROBLEM — F41.9 ANXIETY AND DEPRESSION: Status: ACTIVE | Noted: 2021-10-08

## 2021-10-08 PROBLEM — F19.90 SUBSTANCE USE: Status: ACTIVE | Noted: 2021-10-08

## 2021-10-08 PROBLEM — F32.A ANXIETY AND DEPRESSION: Status: ACTIVE | Noted: 2021-10-08

## 2021-10-08 PROBLEM — G89.4 CHRONIC PAIN SYNDROME: Status: ACTIVE | Noted: 2021-10-08

## 2021-10-08 PROBLEM — M25.50 POLYARTHRALGIA: Status: ACTIVE | Noted: 2021-10-08

## 2021-10-13 ENCOUNTER — HOSPITAL ENCOUNTER (OUTPATIENT)
Dept: GENERAL RADIOLOGY | Facility: HOSPITAL | Age: 66
Discharge: HOME | End: 2021-10-13
Admitting: PHYSICIAN ASSISTANT

## 2021-10-13 DIAGNOSIS — M54.50 CHRONIC BILATERAL LOW BACK PAIN, UNSPECIFIED WHETHER SCIATICA PRESENT: ICD-10-CM

## 2021-10-13 DIAGNOSIS — M51.36 DDD (DEGENERATIVE DISC DISEASE), LUMBAR: ICD-10-CM

## 2021-10-13 DIAGNOSIS — G89.29 CHRONIC BILATERAL LOW BACK PAIN, UNSPECIFIED WHETHER SCIATICA PRESENT: ICD-10-CM

## 2021-10-13 PROCEDURE — 72110 X-RAY EXAM L-2 SPINE 4/>VWS: CPT | Performed by: RADIOLOGY

## 2021-10-13 PROCEDURE — 72110 X-RAY EXAM L-2 SPINE 4/>VWS: CPT

## 2021-10-26 ENCOUNTER — OFFICE VISIT (OUTPATIENT)
Dept: NEUROSURGERY | Facility: CLINIC | Age: 66
End: 2021-10-26

## 2021-10-26 VITALS — BODY MASS INDEX: 30.53 KG/M2 | WEIGHT: 190 LBS | HEIGHT: 66 IN

## 2021-10-26 DIAGNOSIS — M54.50 CHRONIC BILATERAL LOW BACK PAIN, UNSPECIFIED WHETHER SCIATICA PRESENT: Primary | ICD-10-CM

## 2021-10-26 DIAGNOSIS — G89.29 CHRONIC BILATERAL LOW BACK PAIN, UNSPECIFIED WHETHER SCIATICA PRESENT: Primary | ICD-10-CM

## 2021-10-26 PROCEDURE — 99443 PR PHYS/QHP TELEPHONE EVALUATION 21-30 MIN: CPT | Performed by: PHYSICIAN ASSISTANT

## 2021-10-26 NOTE — PROGRESS NOTES
Patient ID: Sandra Momin is a 66 y.o. female  [id number]    You have chosen to receive care through a telephone visit.   Do you consent to use a telephone visit for your medical care today? YES    CC: back pain    History of Present Illness 65 yo very pleasant female with chronic low back pain for years.  She has pain that radiates into the right anterior thigh up into the thoracic spine and down into both hips.  Over the years she is seen multiple different pain doctors with medications and injections without help.  She had a spinal cord stim placed in 2019 and removed in 2020 when this was not helpful.  She was seen in the neurosurgery office on 10/23/2021 and I reviewed an MRI scan of the lumbar spine and recommended physical therapy, tobacco cessation and reevaluation with EMG and nerve conduction studies and flexion-extension x-rays of the lumbar spine.    Chronic illnesses:  Chronic mid back pain  Chronic low back pain  Fibromyalgia  Nicotine abuse  Past Medical History:   Diagnosis Date   • Anemia    • Breast cancer (HCC) 2003   • Headache    • Hearing loss    • Hyperlipidemia    • Hypertension    • Low back pain        Allergies   Allergen Reactions   • Penicillins Anaphylaxis   • Sulfa Antibiotics Rash       Current Outpatient Medications:   •  amLODIPine (NORVASC) 5 MG tablet, TAKE 1 (ONE) TABLET AT BEDTIME FOR BLOOD PRESSURE, Disp: , Rfl:   •  busPIRone (BUSPAR) 10 MG tablet, Take 10 mg by mouth 3 (Three) Times a Day., Disp: , Rfl:   •  cyanocobalamin 1000 MCG/ML injection, cyanocobalamin (vit B-12) 1,000 mcg/mL injection solution  INJECT 1 MILLILITER BY SUBCUTANEOUS ROUTE EVERY 2 WEEKS FOR ONE MONTH THEN EVERY MONTH, Disp: , Rfl:   •  cyclobenzaprine (FLEXERIL) 10 MG tablet, cyclobenzaprine 10 mg tablet, Disp: , Rfl:   •  ergocalciferol (ERGOCALCIFEROL) 1.25 MG (85616 UT) capsule, ergocalciferol (vitamin D2) 1,250 mcg (50,000 unit) capsule, Disp: , Rfl:   •  FLUoxetine (PROzac) 20 MG capsule, Take 40  mg by mouth Daily., Disp: , Rfl:   •  Galcanezumab-gnlm 100 MG/ML solution prefilled syringe, Inject 100 mg under the skin into the appropriate area as directed Every 30 (Thirty) Days., Disp: , Rfl:   •  nitrofurantoin, macrocrystal-monohydrate, (MACROBID) 100 MG capsule, Take 1 capsule by mouth 2 (Two) Times a Day., Disp: 14 capsule, Rfl: 0  •  omeprazole (priLOSEC) 40 MG capsule, Take 40 mg by mouth Daily., Disp: , Rfl:   •  pancrelipase, Lip-Prot-Amyl, (CREON) 33117-74609 units capsule delayed-release particles capsule, Take 24,000 units of lipase by mouth 3 (Three) Times a Day With Meals., Disp: , Rfl:   •  promethazine (PHENERGAN) 25 MG tablet, Take 25 mg by mouth Every 6 (Six) Hours As Needed for Nausea or Vomiting., Disp: , Rfl:   Social History     Tobacco Use   • Smoking status: Current Every Day Smoker     Packs/day: 0.50     Types: Cigarettes   • Smokeless tobacco: Not on file   Vaping Use   • Vaping Use: Every day   • Substances: Flavoring   • Devices: Pre-filled or refillable cartridge   Substance Use Topics   • Alcohol use: Not Currently   • Drug use: Not Currently     Review of Systems   Constitutional: Negative for activity change, appetite change, chills, diaphoresis, fatigue, fever and unexpected weight change.   HENT: Negative for congestion, dental problem, drooling, ear discharge, ear pain, facial swelling, hearing loss, mouth sores, nosebleeds, postnasal drip, rhinorrhea, sinus pressure, sneezing, sore throat, tinnitus, trouble swallowing and voice change.    Eyes: Negative for photophobia, pain, discharge, redness, itching and visual disturbance.   Respiratory: Negative for apnea, cough, choking, chest tightness, shortness of breath, wheezing and stridor.    Cardiovascular: Negative for chest pain, palpitations and leg swelling.   Gastrointestinal: Negative for abdominal distention, abdominal pain, anal bleeding, blood in stool, constipation, diarrhea, nausea, rectal pain and vomiting.  "  Endocrine: Negative for cold intolerance, heat intolerance, polydipsia, polyphagia and polyuria.   Genitourinary: Negative for decreased urine volume, difficulty urinating, dysuria, enuresis, flank pain, frequency, genital sores, hematuria and urgency.   Musculoskeletal: Positive for back pain. Negative for arthralgias, gait problem, joint swelling, myalgias, neck pain and neck stiffness.   Skin: Negative for color change, pallor, rash and wound.   Allergic/Immunologic: Negative for environmental allergies, food allergies and immunocompromised state.   Neurological: Negative for dizziness, tremors, seizures, syncope, facial asymmetry, speech difficulty, weakness, light-headedness, numbness and headaches.   Hematological: Negative for adenopathy. Does not bruise/bleed easily.   Psychiatric/Behavioral: Negative for agitation, behavioral problems, confusion, decreased concentration, dysphoric mood, hallucinations, self-injury, sleep disturbance and suicidal ideas. The patient is not nervous/anxious and is not hyperactive.    All other systems reviewed and are negative.       Social History:    Tobacco Use      Smoking status: Current Every Day Smoker        Packs/day: 0.50        Types: Cigarettes      Smokeless tobacco: Not on file   Sandra Momin  reports that she has been smoking cigarettes. She has been smoking about 0.50 packs per day. She does not have any smokeless tobacco history on file.. I have educated her on the risk of diseases from using tobacco products such as cancer, COPD, heart disease and Osteoarthritis.     I advised her to quit and she is not willing to quit.    I spent 5 minutes counseling the patient.       Ht 167.6 cm (66\")   Wt 86.2 kg (190 lb)   BMI 30.67 kg/m²     PE:  Well developed well-nourished, in no apparent distress at time of examination.  Awake, alert, oriented, conversant.  Head-normocephalic, atraumatic  EENT-sclera clear, eyelids normal  Lungs-normal expansion, no " wheezing  EXT-no edema    Neurologic Exam   The patient reports that her symptoms are the same as earlier in the month when I saw her.    Independent Review of Radiographic Studies:   I have again reviewed the MRI scan of the lumbar spine which shows multilevel degenerative disc disease, there is Modic changes at the endplates of L2-3, bulging disc from L1-2 through L4-5, bilateral foraminal narrowing at multiple levels.  X-rays of the lumbar spine reveal a scoliotic curvature.  No movement with flexion-extension views.      Medical Decision Making:   Impression and plan:  1.  Dextroscoliosis of the thoracolumbar spine.  We discussed surgery however it would entail referral for complex spinal surgery and she does not feel she wants to pursue surgery at this time.  2.  Advanced multilevel degenerative disc disease throughout the lumbar spine.  3.  Chronic mid and low back pain, secondary to #1 #2  4.  Osteoarthritis  5.  Tobacco abuse-the patient needs nicotine cessation to help preserve her disc and bone for the future.  She should also have a DEXA scan to evaluate for osteopenia.  6.  Chronic pain syndrome-I have recommended that she reevaluate a spinal cord stimulator with Dr. Duenas.  I understand that she has had one placed in the past, we do not have records as to placement and she could at least talk to him again and see if this would be even an option.  7.  Continue multiple modalities of physical therapy, medications, modifications of activities and home exercise program in an attempt to improve or not exacerbate her low back pain.  She will give me a call in the office next month with an update on how she is doing.    Patient's Body mass index is 30.67 kg/m². indicating that she is obese (BMI >30). Obesity-related health conditions include the following: osteoarthritis. Obesity is unchanged. BMI is is above average; BMI management plan is completed.       This visit has been rescheduled as a phone visit  to comply with patient safety concerns in accordance with CDC recommendation during the COVID-19 pandemic/federally declared state of public health emergency..  Total time of discussion was 30 minutes.    Roz Ramirez, PAC

## 2021-11-01 ENCOUNTER — TRANSCRIBE ORDERS (OUTPATIENT)
Dept: ADMINISTRATIVE | Facility: HOSPITAL | Age: 66
End: 2021-11-01

## 2021-11-01 DIAGNOSIS — R05.3 CHRONIC COUGH: Primary | ICD-10-CM

## 2021-12-10 DIAGNOSIS — Z12.11 ENCOUNTER FOR SCREENING FOR MALIGNANT NEOPLASM OF COLON: Primary | ICD-10-CM

## 2021-12-10 DIAGNOSIS — Z01.818 PREOPERATIVE CLEARANCE: ICD-10-CM

## 2021-12-17 ENCOUNTER — LAB (OUTPATIENT)
Dept: LAB | Facility: HOSPITAL | Age: 66
End: 2021-12-17

## 2021-12-17 DIAGNOSIS — Z12.11 ENCOUNTER FOR SCREENING FOR MALIGNANT NEOPLASM OF COLON: ICD-10-CM

## 2021-12-17 DIAGNOSIS — Z01.818 PREOPERATIVE CLEARANCE: ICD-10-CM

## 2021-12-17 PROCEDURE — U0004 COV-19 TEST NON-CDC HGH THRU: HCPCS | Performed by: INTERNAL MEDICINE

## 2021-12-17 PROCEDURE — C9803 HOPD COVID-19 SPEC COLLECT: HCPCS

## 2021-12-18 LAB — SARS-COV-2 RNA PNL SPEC NAA+PROBE: NOT DETECTED

## 2021-12-20 ENCOUNTER — ANESTHESIA EVENT (OUTPATIENT)
Dept: PERIOP | Facility: HOSPITAL | Age: 66
End: 2021-12-20

## 2021-12-20 ENCOUNTER — HOSPITAL ENCOUNTER (OUTPATIENT)
Facility: HOSPITAL | Age: 66
Setting detail: HOSPITAL OUTPATIENT SURGERY
Discharge: HOME | End: 2021-12-20
Attending: INTERNAL MEDICINE | Admitting: INTERNAL MEDICINE

## 2021-12-20 ENCOUNTER — ANESTHESIA (OUTPATIENT)
Dept: PERIOP | Facility: HOSPITAL | Age: 66
End: 2021-12-20

## 2021-12-20 VITALS
WEIGHT: 185 LBS | HEIGHT: 66 IN | SYSTOLIC BLOOD PRESSURE: 125 MMHG | HEART RATE: 60 BPM | RESPIRATION RATE: 18 BRPM | BODY MASS INDEX: 29.73 KG/M2 | TEMPERATURE: 97.4 F | OXYGEN SATURATION: 96 % | DIASTOLIC BLOOD PRESSURE: 70 MMHG

## 2021-12-20 DIAGNOSIS — Z01.818 PREOPERATIVE CLEARANCE: ICD-10-CM

## 2021-12-20 DIAGNOSIS — Z12.11 ENCOUNTER FOR SCREENING FOR MALIGNANT NEOPLASM OF COLON: ICD-10-CM

## 2021-12-20 PROCEDURE — 45385 COLONOSCOPY W/LESION REMOVAL: CPT | Performed by: INTERNAL MEDICINE

## 2021-12-20 PROCEDURE — 25010000002 PROPOFOL 10 MG/ML EMULSION: Performed by: NURSE ANESTHETIST, CERTIFIED REGISTERED

## 2021-12-20 RX ORDER — SODIUM CHLORIDE, SODIUM LACTATE, POTASSIUM CHLORIDE, CALCIUM CHLORIDE 600; 310; 30; 20 MG/100ML; MG/100ML; MG/100ML; MG/100ML
125 INJECTION, SOLUTION INTRAVENOUS ONCE
Status: COMPLETED | OUTPATIENT
Start: 2021-12-20 | End: 2021-12-20

## 2021-12-20 RX ORDER — DROPERIDOL 2.5 MG/ML
0.62 INJECTION, SOLUTION INTRAMUSCULAR; INTRAVENOUS ONCE AS NEEDED
Status: DISCONTINUED | OUTPATIENT
Start: 2021-12-20 | End: 2021-12-20 | Stop reason: HOSPADM

## 2021-12-20 RX ORDER — LIDOCAINE HYDROCHLORIDE 20 MG/ML
INJECTION, SOLUTION INFILTRATION; PERINEURAL AS NEEDED
Status: DISCONTINUED | OUTPATIENT
Start: 2021-12-20 | End: 2021-12-20 | Stop reason: SURG

## 2021-12-20 RX ORDER — KETOROLAC TROMETHAMINE 30 MG/ML
15 INJECTION, SOLUTION INTRAMUSCULAR; INTRAVENOUS EVERY 6 HOURS PRN
Status: DISCONTINUED | OUTPATIENT
Start: 2021-12-20 | End: 2021-12-20 | Stop reason: HOSPADM

## 2021-12-20 RX ORDER — FENTANYL CITRATE 50 UG/ML
50 INJECTION, SOLUTION INTRAMUSCULAR; INTRAVENOUS
Status: DISCONTINUED | OUTPATIENT
Start: 2021-12-20 | End: 2021-12-20 | Stop reason: HOSPADM

## 2021-12-20 RX ORDER — SODIUM CHLORIDE 0.9 % (FLUSH) 0.9 %
10 SYRINGE (ML) INJECTION EVERY 12 HOURS SCHEDULED
Status: DISCONTINUED | OUTPATIENT
Start: 2021-12-20 | End: 2021-12-20 | Stop reason: HOSPADM

## 2021-12-20 RX ORDER — PROPOFOL 10 MG/ML
VIAL (ML) INTRAVENOUS AS NEEDED
Status: DISCONTINUED | OUTPATIENT
Start: 2021-12-20 | End: 2021-12-20 | Stop reason: SURG

## 2021-12-20 RX ORDER — MEPERIDINE HYDROCHLORIDE 25 MG/ML
12.5 INJECTION INTRAMUSCULAR; INTRAVENOUS; SUBCUTANEOUS
Status: DISCONTINUED | OUTPATIENT
Start: 2021-12-20 | End: 2021-12-20 | Stop reason: HOSPADM

## 2021-12-20 RX ORDER — IPRATROPIUM BROMIDE AND ALBUTEROL SULFATE 2.5; .5 MG/3ML; MG/3ML
3 SOLUTION RESPIRATORY (INHALATION) ONCE AS NEEDED
Status: DISCONTINUED | OUTPATIENT
Start: 2021-12-20 | End: 2021-12-20 | Stop reason: HOSPADM

## 2021-12-20 RX ORDER — SODIUM CHLORIDE 0.9 % (FLUSH) 0.9 %
10 SYRINGE (ML) INJECTION AS NEEDED
Status: DISCONTINUED | OUTPATIENT
Start: 2021-12-20 | End: 2021-12-20 | Stop reason: HOSPADM

## 2021-12-20 RX ORDER — OXYCODONE HYDROCHLORIDE AND ACETAMINOPHEN 5; 325 MG/1; MG/1
1 TABLET ORAL ONCE AS NEEDED
Status: DISCONTINUED | OUTPATIENT
Start: 2021-12-20 | End: 2021-12-20 | Stop reason: HOSPADM

## 2021-12-20 RX ORDER — SODIUM CHLORIDE, SODIUM LACTATE, POTASSIUM CHLORIDE, CALCIUM CHLORIDE 600; 310; 30; 20 MG/100ML; MG/100ML; MG/100ML; MG/100ML
100 INJECTION, SOLUTION INTRAVENOUS ONCE AS NEEDED
Status: DISCONTINUED | OUTPATIENT
Start: 2021-12-20 | End: 2021-12-20 | Stop reason: HOSPADM

## 2021-12-20 RX ORDER — ONDANSETRON 2 MG/ML
4 INJECTION INTRAMUSCULAR; INTRAVENOUS AS NEEDED
Status: DISCONTINUED | OUTPATIENT
Start: 2021-12-20 | End: 2021-12-20 | Stop reason: HOSPADM

## 2021-12-20 RX ORDER — MIDAZOLAM HYDROCHLORIDE 1 MG/ML
0.5 INJECTION INTRAMUSCULAR; INTRAVENOUS
Status: DISCONTINUED | OUTPATIENT
Start: 2021-12-20 | End: 2021-12-20 | Stop reason: HOSPADM

## 2021-12-20 RX ADMIN — PROPOFOL 150 MCG/KG/MIN: 10 INJECTION, EMULSION INTRAVENOUS at 09:40

## 2021-12-20 RX ADMIN — PROPOFOL 60 MG: 10 INJECTION, EMULSION INTRAVENOUS at 09:40

## 2021-12-20 RX ADMIN — SODIUM CHLORIDE, POTASSIUM CHLORIDE, SODIUM LACTATE AND CALCIUM CHLORIDE: 600; 310; 30; 20 INJECTION, SOLUTION INTRAVENOUS at 09:40

## 2021-12-20 RX ADMIN — LIDOCAINE HYDROCHLORIDE 60 MG: 20 INJECTION, SOLUTION INFILTRATION; PERINEURAL at 09:39

## 2021-12-20 NOTE — OP NOTE
COLONOSCOPY PROCEDURE NOTE    Sandra Momin  12/20/2021    GASTROENTEROLOGIST:  Candelario Fierro MD    PRE-PROCEDURE DIAGNOSIS/ INDICATION:   Routine colon cancer screening    POST-PROCEDURE DIAGNOSIS/ FINDINGS:  1.-Terminal ileum normal  2.-Status post cold snare polypectomy of five 4 to 6 mm polyps (1 from the ascending colon, 2 from sigmoid colon and one from the transverse colon)    PROCEDURE:  COLONOSCOPY with cold snare polypectomy    ANESTHESIA:  Propofol administered by anesthesia.  See anesthesia notes for ASA classification      OPERATIVE PROCEDURE   After proper informed consent was obtained, the patient was taken the operating suite and placed in left lateral decubitus position.  An Olympus video colonoscope 180 series was inserted in the rectum and advanced to the terminal ileum under direct visualization.  Cecum and terminal ileum were identified by visualization of the appendiceal orifice and ileocecal valve.  The colonoscope was then slowly withdrawn from the cecum to the rectum and passed a second time from rectum to cecum.  The colonoscope was retroflexed in the cecum and rectum.  Bowel prep and visualization was excellent.  Scope was then withdrawn. Patient tolerated the procedure well. There were no immediate complications. Cecal withdrawal time was 23 minutes.    ESTIMATED BLOOD LOSS  None    SPECIMENS  Colonic polyps as noted above.  Rule out adenoma             COMPLICATIONS  None    RECOMMENDATIONS:  1.-Await pathology report  2.-Assuming polyps prove adenomatous, repeat colonoscopy is recommended in 3 years    Candelario Fierro MD  12/20/21 10:10 EST

## 2021-12-20 NOTE — H&P
Chief complaint   Colorectal cancer screening    Subjective     Patient is a 66 y.o. female who presents today for a screening colonoscopy.  She denies history of colon polyps.  She also denies GI symptoms, including rectal bleeding.  Her last colonoscopy was years ago.   Family history is negative for GI disease.  Medical, surgical, and social histories were reviewed and are listed below.      Review of Systems  Review of Systems - General ROS: negative for - weight loss  Psychological ROS: negative for - behavioral disorder  Ophthalmic ROS: negative for - dry eyes  ENT ROS: negative for - vertigo or vocal changes  Hematological and Lymphatic ROS: negative for - jaundice or swollen lymph nodes  Respiratory ROS: negative for - sputum changes or stridor  Cardiovascular ROS: negative for - irregular heartbeat or murmur  Gastrointestinal ROS: negative for - blood in stools or change in stools  Genito-Urinary ROS: negative for - hematuria or incontinence  Musculoskeletal ROS: negative for - gait disturbance      History  Past Medical History:   Diagnosis Date   • Anemia    • Breast cancer (HCC)    • Headache    • Hearing loss    • Hyperlipidemia    • Hypertension    • Low back pain      Past Surgical History:   Procedure Laterality Date   • BLADDER REPAIR W/  SECTION     • FOOT SURGERY Bilateral    • GALLBLADDER SURGERY     • HERNIA REPAIR     • HYSTERECTOMY     • KNEE ACL RECONSTRUCTION      2016   • MASTECTOMY Left 2003   • REPLACEMENT TOTAL KNEE     • SPINAL CORD STIMULATOR REMOVAL  2019     Family History   Problem Relation Age of Onset   • Breast cancer Mother    • Cancer Mother    • Hypertension Mother    • Breast cancer Maternal Grandmother    • Breast cancer Maternal Aunt    • Breast cancer Paternal Aunt    • Breast cancer Maternal Aunt    • Breast cancer Maternal Aunt    • BRCA 1/2 Daughter    • Heart disease Father    • Hypertension Father      Social History     Tobacco Use   • Smoking  status: Current Every Day Smoker     Packs/day: 0.50     Types: Cigarettes   • Smokeless tobacco: Not on file   Vaping Use   • Vaping Use: Every day   • Substances: Flavoring   • Devices: Pre-filled or refillable cartridge   Substance Use Topics   • Alcohol use: Not Currently   • Drug use: Not Currently     Medications Prior to Admission   Medication Sig Dispense Refill Last Dose   • amLODIPine (NORVASC) 5 MG tablet TAKE 1 (ONE) TABLET AT BEDTIME FOR BLOOD PRESSURE      • busPIRone (BUSPAR) 10 MG tablet Take 10 mg by mouth 3 (Three) Times a Day.      • cyanocobalamin 1000 MCG/ML injection cyanocobalamin (vit B-12) 1,000 mcg/mL injection solution   INJECT 1 MILLILITER BY SUBCUTANEOUS ROUTE EVERY 2 WEEKS FOR ONE MONTH THEN EVERY MONTH      • cyclobenzaprine (FLEXERIL) 10 MG tablet cyclobenzaprine 10 mg tablet      • ergocalciferol (ERGOCALCIFEROL) 1.25 MG (17660 UT) capsule ergocalciferol (vitamin D2) 1,250 mcg (50,000 unit) capsule      • FLUoxetine (PROzac) 20 MG capsule Take 40 mg by mouth Daily.      • Galcanezumab-gnlm 100 MG/ML solution prefilled syringe Inject 100 mg under the skin into the appropriate area as directed Every 30 (Thirty) Days.      • nitrofurantoin, macrocrystal-monohydrate, (MACROBID) 100 MG capsule Take 1 capsule by mouth 2 (Two) Times a Day. 14 capsule 0    • omeprazole (priLOSEC) 40 MG capsule Take 40 mg by mouth Daily.      • pancrelipase, Lip-Prot-Amyl, (CREON) 12685-86343 units capsule delayed-release particles capsule Take 24,000 units of lipase by mouth 3 (Three) Times a Day With Meals.      • promethazine (PHENERGAN) 25 MG tablet Take 25 mg by mouth Every 6 (Six) Hours As Needed for Nausea or Vomiting.        Allergies:  Penicillins and Sulfa antibiotics    Objective     Vital Signs       Physical Exam  General:  This is a WD pleasant female in no acute distress  Vital signs stable, afebrile  HEENT exam:  WNL. Sclera are anicteric.  EOMI  Neck:  supple, FROM.  No JVD.  Trachea  midline  Lungs:  Respiratory effort normal. Auscultation: Clear, without wheezes, rhonchi, rales  Heart:  Regular rate and rhythm, without murmur, gallop, rub.  No pedal edema  Abdomen:  No tenderness or palpable masses;  Bowel sounds normal  Musculoskeletal:  muscle strength/tone is normal.    Psyc:  alert, oriented x 3.  Mood and affect are appropriate  skin:  Warm with good turgor.  Without rash or lesion  extremities:  Examination of the extremities revealed no cyanosis, clubbing or edema.    Results Review:                     Invalid input(s): PROTCrCl cannot be calculated (Patient's most recent lab result is older than the maximum 30 days allowed.).  No results found for: AMMONIA      No results found for: BLOODCX  No results found for: URINECX  No results found for: WOUNDCX  No results found for: STOOLCX    Imaging:  Imaging Results (Last 24 Hours)     ** No results found for the last 24 hours. **                Impression:  Patient Active Problem List   Diagnosis Code   • Ingrown toenail of right foot L60.0   • Low back pain M54.50   • DDD (degenerative disc disease), lumbar M51.36   • Chronic pain syndrome G89.4   • Polyarthralgia M25.50   • Substance use F19.90   • Anxiety and depression F41.9, F32.A   • Preoperative clearance Z01.818   • Encounter for screening for malignant neoplasm of colon Z12.11       Assessment:  1.  Colorectal cancer screening    Plan:  The patient will undergo a screening colonoscopy.  Procedure was discussed with the patient who voiced understanding and agreement.    PJ Fuentes  12/20/21  09:12 EST

## 2021-12-20 NOTE — ANESTHESIA POSTPROCEDURE EVALUATION
Patient: Sandra Momin    Procedure Summary     Date: 12/20/21 Room / Location: [facility] OR [address] [place] [address] COR OR    Anesthesia Start: 0938 Anesthesia Stop: 1014    Procedure: COLONOSCOPY FOR SCREENING (N/A ) Diagnosis:       Preoperative clearance      Encounter for screening for malignant neoplasm of colon      (Preoperative clearance [Z01.818])      (Encounter for screening for malignant neoplasm of colon [Z12.11])    Surgeons: Candelario Fierro MD Provider: Asher Harkins MD    Anesthesia Type: general ASA Status: 2          Anesthesia Type: general    Vitals  Vitals Value Taken Time   /70 12/20/21 1045   Temp 97.4 °F (36.3 °C) 12/20/21 1015   Pulse 60 12/20/21 1045   Resp 18 12/20/21 1045   SpO2 96 % 12/20/21 1045           Post Anesthesia Care and Evaluation    Patient location during evaluation: PHASE II  Patient participation: complete - patient participated  Level of consciousness: awake  Pain score: 0  Pain management: adequate  Airway patency: patent  Anesthetic complications: No anesthetic complications  PONV Status: none  Cardiovascular status: hemodynamically stable  Respiratory status: acceptable  Hydration status: acceptable

## 2021-12-20 NOTE — ANESTHESIA PREPROCEDURE EVALUATION
Anesthesia Evaluation     Patient summary reviewed and Nursing notes reviewed   NPO Solid Status: > 8 hours  NPO Liquid Status: > 8 hours           Airway   Mallampati: I  TM distance: >3 FB  Neck ROM: full  Dental    (+) edentulous, upper dentures and lower dentures    Pulmonary     breath sounds clear to auscultation  Cardiovascular   Exercise tolerance: good (4-7 METS)    Rhythm: regular  Rate: normal    (+) hypertension, hyperlipidemia,       Neuro/Psych  (+) headaches, psychiatric history,     GI/Hepatic/Renal/Endo      Musculoskeletal     Abdominal     Abdomen: soft.   Substance History      OB/GYN          Other   arthritis,    history of cancer                    Anesthesia Plan    ASA 2     general     intravenous induction     Anesthetic plan, all risks, benefits, and alternatives have been provided, discussed and informed consent has been obtained with: patient.    Plan discussed with CRNA.

## 2021-12-22 LAB
LAB AP CASE REPORT: NORMAL
PATH REPORT.FINAL DX SPEC: NORMAL

## 2022-06-29 ENCOUNTER — APPOINTMENT (OUTPATIENT)
Dept: GENERAL RADIOLOGY | Facility: HOSPITAL | Age: 67
End: 2022-06-29

## 2022-06-29 ENCOUNTER — HOSPITAL ENCOUNTER (EMERGENCY)
Facility: HOSPITAL | Age: 67
Discharge: HOME | End: 2022-06-29
Attending: STUDENT IN AN ORGANIZED HEALTH CARE EDUCATION/TRAINING PROGRAM | Admitting: STUDENT IN AN ORGANIZED HEALTH CARE EDUCATION/TRAINING PROGRAM

## 2022-06-29 VITALS
HEIGHT: 66 IN | SYSTOLIC BLOOD PRESSURE: 120 MMHG | RESPIRATION RATE: 18 BRPM | DIASTOLIC BLOOD PRESSURE: 71 MMHG | HEART RATE: 82 BPM | BODY MASS INDEX: 29.57 KG/M2 | OXYGEN SATURATION: 100 % | WEIGHT: 184 LBS | TEMPERATURE: 98.2 F

## 2022-06-29 DIAGNOSIS — R09.1 PLEURISY: ICD-10-CM

## 2022-06-29 DIAGNOSIS — R07.89 CHEST WALL PAIN: Primary | ICD-10-CM

## 2022-06-29 LAB
ALBUMIN SERPL-MCNC: 4.33 G/DL (ref 3.5–5.2)
ALBUMIN/GLOB SERPL: 1.6 G/DL
ALP SERPL-CCNC: 93 U/L (ref 39–117)
ALT SERPL W P-5'-P-CCNC: 9 U/L (ref 1–33)
ANION GAP SERPL CALCULATED.3IONS-SCNC: 12.3 MMOL/L (ref 5–15)
AST SERPL-CCNC: 14 U/L (ref 1–32)
BASOPHILS # BLD AUTO: 0.15 10*3/MM3 (ref 0–0.2)
BASOPHILS NFR BLD AUTO: 1.8 % (ref 0–1.5)
BILIRUB SERPL-MCNC: 0.3 MG/DL (ref 0–1.2)
BUN SERPL-MCNC: 17 MG/DL (ref 8–23)
BUN/CREAT SERPL: 15.5 (ref 7–25)
CALCIUM SPEC-SCNC: 9.4 MG/DL (ref 8.6–10.5)
CHLORIDE SERPL-SCNC: 101 MMOL/L (ref 98–107)
CO2 SERPL-SCNC: 24.7 MMOL/L (ref 22–29)
CREAT SERPL-MCNC: 1.1 MG/DL (ref 0.57–1)
DEPRECATED RDW RBC AUTO: 43.7 FL (ref 37–54)
EGFRCR SERPLBLD CKD-EPI 2021: 55.5 ML/MIN/1.73
EOSINOPHIL # BLD AUTO: 1.29 10*3/MM3 (ref 0–0.4)
EOSINOPHIL NFR BLD AUTO: 15.1 % (ref 0.3–6.2)
ERYTHROCYTE [DISTWIDTH] IN BLOOD BY AUTOMATED COUNT: 12.8 % (ref 12.3–15.4)
GLOBULIN UR ELPH-MCNC: 2.8 GM/DL
GLUCOSE SERPL-MCNC: 94 MG/DL (ref 65–99)
HCT VFR BLD AUTO: 39.8 % (ref 34–46.6)
HGB BLD-MCNC: 13.1 G/DL (ref 12–15.9)
IMM GRANULOCYTES # BLD AUTO: 0.02 10*3/MM3 (ref 0–0.05)
IMM GRANULOCYTES NFR BLD AUTO: 0.2 % (ref 0–0.5)
LYMPHOCYTES # BLD AUTO: 2.23 10*3/MM3 (ref 0.7–3.1)
LYMPHOCYTES NFR BLD AUTO: 26.2 % (ref 19.6–45.3)
MCH RBC QN AUTO: 30.8 PG (ref 26.6–33)
MCHC RBC AUTO-ENTMCNC: 32.9 G/DL (ref 31.5–35.7)
MCV RBC AUTO: 93.6 FL (ref 79–97)
MONOCYTES # BLD AUTO: 0.58 10*3/MM3 (ref 0.1–0.9)
MONOCYTES NFR BLD AUTO: 6.8 % (ref 5–12)
NEUTROPHILS NFR BLD AUTO: 4.25 10*3/MM3 (ref 1.7–7)
NEUTROPHILS NFR BLD AUTO: 49.9 % (ref 42.7–76)
NRBC BLD AUTO-RTO: 0 /100 WBC (ref 0–0.2)
PLATELET # BLD AUTO: 289 10*3/MM3 (ref 140–450)
PMV BLD AUTO: 9.5 FL (ref 6–12)
POTASSIUM SERPL-SCNC: 4 MMOL/L (ref 3.5–5.2)
PROT SERPL-MCNC: 7.1 G/DL (ref 6–8.5)
RBC # BLD AUTO: 4.25 10*6/MM3 (ref 3.77–5.28)
SODIUM SERPL-SCNC: 138 MMOL/L (ref 136–145)
TROPONIN T SERPL-MCNC: <0.01 NG/ML (ref 0–0.03)
TROPONIN T SERPL-MCNC: <0.01 NG/ML (ref 0–0.03)
WBC NRBC COR # BLD: 8.52 10*3/MM3 (ref 3.4–10.8)

## 2022-06-29 PROCEDURE — 93010 ELECTROCARDIOGRAM REPORT: CPT | Performed by: INTERNAL MEDICINE

## 2022-06-29 PROCEDURE — 84484 ASSAY OF TROPONIN QUANT: CPT | Performed by: PHYSICIAN ASSISTANT

## 2022-06-29 PROCEDURE — 36415 COLL VENOUS BLD VENIPUNCTURE: CPT

## 2022-06-29 PROCEDURE — 71101 X-RAY EXAM UNILAT RIBS/CHEST: CPT

## 2022-06-29 PROCEDURE — 71101 X-RAY EXAM UNILAT RIBS/CHEST: CPT | Performed by: RADIOLOGY

## 2022-06-29 PROCEDURE — 96374 THER/PROPH/DIAG INJ IV PUSH: CPT

## 2022-06-29 PROCEDURE — 85025 COMPLETE CBC W/AUTO DIFF WBC: CPT | Performed by: PHYSICIAN ASSISTANT

## 2022-06-29 PROCEDURE — 25010000002 MORPHINE PER 10 MG: Performed by: EMERGENCY MEDICINE

## 2022-06-29 PROCEDURE — 96375 TX/PRO/DX INJ NEW DRUG ADDON: CPT

## 2022-06-29 PROCEDURE — 25010000002 ONDANSETRON PER 1 MG: Performed by: PHYSICIAN ASSISTANT

## 2022-06-29 PROCEDURE — 80053 COMPREHEN METABOLIC PANEL: CPT | Performed by: PHYSICIAN ASSISTANT

## 2022-06-29 PROCEDURE — 99283 EMERGENCY DEPT VISIT LOW MDM: CPT

## 2022-06-29 PROCEDURE — 93005 ELECTROCARDIOGRAM TRACING: CPT | Performed by: STUDENT IN AN ORGANIZED HEALTH CARE EDUCATION/TRAINING PROGRAM

## 2022-06-29 RX ORDER — KETOROLAC TROMETHAMINE 30 MG/ML
30 INJECTION, SOLUTION INTRAMUSCULAR; INTRAVENOUS EVERY 6 HOURS PRN
Status: DISCONTINUED | OUTPATIENT
Start: 2022-06-29 | End: 2022-06-29 | Stop reason: HOSPADM

## 2022-06-29 RX ORDER — ONDANSETRON 2 MG/ML
4 INJECTION INTRAMUSCULAR; INTRAVENOUS ONCE
Status: COMPLETED | OUTPATIENT
Start: 2022-06-29 | End: 2022-06-29

## 2022-06-29 RX ADMIN — ONDANSETRON 4 MG: 2 INJECTION INTRAMUSCULAR; INTRAVENOUS at 16:38

## 2022-06-29 RX ADMIN — MORPHINE SULFATE 4 MG: 4 INJECTION, SOLUTION INTRAMUSCULAR; INTRAVENOUS at 16:37

## 2022-06-30 LAB
QT INTERVAL: 394 MS
QTC INTERVAL: 462 MS

## 2022-07-20 ENCOUNTER — TRANSCRIBE ORDERS (OUTPATIENT)
Dept: ADMINISTRATIVE | Facility: HOSPITAL | Age: 67
End: 2022-07-20

## 2022-07-20 DIAGNOSIS — R06.02 SHORTNESS OF BREATH: Primary | ICD-10-CM

## 2023-04-17 ENCOUNTER — TELEPHONE (OUTPATIENT)
Dept: NEUROSURGERY | Facility: CLINIC | Age: 68
End: 2023-04-17
Payer: COMMERCIAL

## 2023-04-17 NOTE — TELEPHONE ENCOUNTER
"Provider: MANDI BEAVERS PA-C    Caller: JAREN NICHOLS    Relationship to Patient: SELF    Pharmacy:   [id number] - [place], [place] - [address] AT [facility] OF [address] - [phone] PH - [phone] FX   [address] [place] [address]   Phone: [phone] Fax: [phone]   Hours: Not open 24 hours     Phone Number: [phone]    Reason for Call: WORSENING SAME SYMPTOMS AS 2021 'REALLY LIKE MANDI BEAVERS PA-C AND WOULD ONLY WANT TO SEE HER BUT SHE USED TO COME TO [place].'     When was the patient last seen: Office Visit with Marcie Beavers PA-C (10/26/2021)    When did it start: 'NEVER WENT AWAY, IT'S JUST GOTTEN TO POINT WHERE THOSE SAME SYMPTOMS I HAD I NEED TO BE SEEN.'     Where is it located:     CENTER OF BACK ALL WAY DOWN DOWN TO RIGHT AT / PAST TAILBONE.    'SOME URINARY LEAKAGE, GETTING WORSE.'   INTERMITTENT FOOT NUMBNESS.    Characteristics of symptom/severity: \"20+/10\"    Timing- Is it constant or intermittent: CONSTANT     What makes it worse: ACTIVITY AND SITTING TOO LONG;    What makes it better: HEAT PROVIDES RELIEF     What therapies/medications have you tried:  PATIENT STATES \"PERCOCET 7.5MG    W/T TO [facility] FOR CLINICAL / SCHEDULING D/T 'URINARY LOSS.'    "

## 2023-04-17 NOTE — TELEPHONE ENCOUNTER
Spoke to patient to schedule appointment with James ROBBINS in [facility]. Patient stated she would like to get an MRI completed and see Dr. Paz instead in [place]. Advised she will need to see her PCP, have them order an MRI, and let our office know when that has been completed so that Dr. Paz can review it for [facility]. She voiced understanding.

## 2023-06-19 ENCOUNTER — TRANSCRIBE ORDERS (OUTPATIENT)
Dept: ADMINISTRATIVE | Facility: HOSPITAL | Age: 68
End: 2023-06-19
Payer: COMMERCIAL

## 2023-06-19 DIAGNOSIS — R10.32 ABDOMINAL PAIN, LEFT LOWER QUADRANT: Primary | ICD-10-CM

## 2023-06-29 PROBLEM — R10.30 LOWER ABDOMINAL PAIN: Status: ACTIVE | Noted: 2023-06-29

## 2023-06-29 PROBLEM — K31.89 GASTRIC WALL THICKENING: Status: ACTIVE | Noted: 2023-06-29

## 2023-07-20 ENCOUNTER — TRANSCRIBE ORDERS (OUTPATIENT)
Dept: ADMINISTRATIVE | Facility: HOSPITAL | Age: 68
End: 2023-07-20
Payer: COMMERCIAL

## 2023-07-20 DIAGNOSIS — Z12.31 VISIT FOR SCREENING MAMMOGRAM: Primary | ICD-10-CM

## 2023-07-20 DIAGNOSIS — Z78.0 POSTMENOPAUSAL: ICD-10-CM

## 2023-08-15 ENCOUNTER — HOSPITAL ENCOUNTER (OUTPATIENT)
Dept: BONE DENSITY | Facility: HOSPITAL | Age: 68
Discharge: HOME | End: 2023-08-15
Payer: COMMERCIAL

## 2023-08-15 ENCOUNTER — HOSPITAL ENCOUNTER (OUTPATIENT)
Dept: MAMMOGRAPHY | Facility: HOSPITAL | Age: 68
Discharge: HOME | End: 2023-08-15
Payer: COMMERCIAL

## 2023-08-15 DIAGNOSIS — Z12.31 VISIT FOR SCREENING MAMMOGRAM: ICD-10-CM

## 2023-08-15 DIAGNOSIS — Z78.0 POSTMENOPAUSAL: ICD-10-CM

## 2023-08-15 PROCEDURE — 77063 BREAST TOMOSYNTHESIS BI: CPT

## 2023-08-15 PROCEDURE — 77067 SCR MAMMO BI INCL CAD: CPT

## 2023-08-15 PROCEDURE — 77080 DXA BONE DENSITY AXIAL: CPT

## 2025-02-17 ENCOUNTER — TRANSCRIBE ORDERS (OUTPATIENT)
Dept: ADMINISTRATIVE | Facility: HOSPITAL | Age: 70
End: 2025-02-17
Payer: COMMERCIAL

## 2025-02-17 DIAGNOSIS — R10.9 FLANK PAIN: ICD-10-CM

## 2025-02-17 DIAGNOSIS — R31.29 OTHER MICROSCOPIC HEMATURIA: Primary | ICD-10-CM

## 2025-04-01 ENCOUNTER — TRANSCRIBE ORDERS (OUTPATIENT)
Dept: ADMINISTRATIVE | Facility: HOSPITAL | Age: 70
End: 2025-04-01
Payer: COMMERCIAL

## 2025-04-01 DIAGNOSIS — R10.9 STOMACH ACHE: ICD-10-CM

## 2025-04-01 DIAGNOSIS — Z87.891 PERSONAL HISTORY OF TOBACCO USE, PRESENTING HAZARDS TO HEALTH: Primary | ICD-10-CM

## 2025-04-01 DIAGNOSIS — Z12.31 VISIT FOR SCREENING MAMMOGRAM: ICD-10-CM

## 2025-04-01 DIAGNOSIS — R31.29 MICROSCOPIC HEMATURIA: ICD-10-CM

## 2025-05-12 ENCOUNTER — HOSPITAL ENCOUNTER (OUTPATIENT)
Dept: CT IMAGING | Facility: HOSPITAL | Age: 70
Discharge: HOME | End: 2025-05-12
Payer: COMMERCIAL

## 2025-05-12 ENCOUNTER — HOSPITAL ENCOUNTER (OUTPATIENT)
Dept: MAMMOGRAPHY | Facility: HOSPITAL | Age: 70
Discharge: HOME | End: 2025-05-12
Payer: COMMERCIAL

## 2025-05-12 DIAGNOSIS — Z87.891 PERSONAL HISTORY OF TOBACCO USE, PRESENTING HAZARDS TO HEALTH: ICD-10-CM

## 2025-05-12 DIAGNOSIS — R10.9 STOMACH ACHE: ICD-10-CM

## 2025-05-12 DIAGNOSIS — Z12.31 VISIT FOR SCREENING MAMMOGRAM: ICD-10-CM

## 2025-05-12 DIAGNOSIS — R31.29 MICROSCOPIC HEMATURIA: ICD-10-CM

## 2025-05-12 LAB — CREAT BLDA-MCNC: 1.3 MG/DL (ref 0.6–1.3)

## 2025-05-12 PROCEDURE — 25510000001 IOPAMIDOL 61 % SOLUTION: Performed by: FAMILY MEDICINE

## 2025-05-12 PROCEDURE — 77067 SCR MAMMO BI INCL CAD: CPT | Performed by: RADIOLOGY

## 2025-05-12 PROCEDURE — 71271 CT THORAX LUNG CANCER SCR C-: CPT | Performed by: RADIOLOGY

## 2025-05-12 PROCEDURE — 71271 CT THORAX LUNG CANCER SCR C-: CPT

## 2025-05-12 PROCEDURE — 77063 BREAST TOMOSYNTHESIS BI: CPT

## 2025-05-12 PROCEDURE — 74178 CT ABD&PLV WO CNTR FLWD CNTR: CPT | Performed by: RADIOLOGY

## 2025-05-12 PROCEDURE — 77067 SCR MAMMO BI INCL CAD: CPT

## 2025-05-12 PROCEDURE — 74178 CT ABD&PLV WO CNTR FLWD CNTR: CPT

## 2025-05-12 PROCEDURE — 82565 ASSAY OF CREATININE: CPT

## 2025-05-12 PROCEDURE — 77063 BREAST TOMOSYNTHESIS BI: CPT | Performed by: RADIOLOGY

## 2025-05-12 RX ORDER — IOPAMIDOL 612 MG/ML
100 INJECTION, SOLUTION INTRAVASCULAR
Status: COMPLETED | OUTPATIENT
Start: 2025-05-12 | End: 2025-05-12

## 2025-05-12 RX ADMIN — IOPAMIDOL 100 ML: 612 INJECTION, SOLUTION INTRAVENOUS at 13:31

## 2025-06-17 ENCOUNTER — OFFICE VISIT (OUTPATIENT)
Age: 70
End: 2025-06-17
Payer: COMMERCIAL

## 2025-06-17 ENCOUNTER — PATIENT ROUNDING (BHMG ONLY) (OUTPATIENT)
Dept: CARDIOLOGY | Facility: CLINIC | Age: 70
End: 2025-06-17
Payer: COMMERCIAL

## 2025-06-17 VITALS
DIASTOLIC BLOOD PRESSURE: 80 MMHG | WEIGHT: 195 LBS | SYSTOLIC BLOOD PRESSURE: 133 MMHG | BODY MASS INDEX: 31.34 KG/M2 | HEIGHT: 66 IN | HEART RATE: 64 BPM | OXYGEN SATURATION: 97 %

## 2025-06-17 DIAGNOSIS — R07.2 PRECORDIAL PAIN: Primary | ICD-10-CM

## 2025-06-17 DIAGNOSIS — R06.09 DYSPNEA ON EXERTION: ICD-10-CM

## 2025-06-17 DIAGNOSIS — I10 ESSENTIAL HYPERTENSION: ICD-10-CM

## 2025-06-17 DIAGNOSIS — I25.10 CORONARY ARTERY CALCIFICATION SEEN ON CT SCAN: ICD-10-CM

## 2025-06-17 PROCEDURE — 93000 ELECTROCARDIOGRAM COMPLETE: CPT | Performed by: INTERNAL MEDICINE

## 2025-06-17 PROCEDURE — 99204 OFFICE O/P NEW MOD 45 MIN: CPT | Performed by: INTERNAL MEDICINE

## 2025-06-17 RX ORDER — OXYCODONE AND ACETAMINOPHEN 7.5; 325 MG/1; MG/1
1 TABLET ORAL EVERY 6 HOURS PRN
COMMUNITY

## 2025-06-17 RX ORDER — METOPROLOL SUCCINATE 25 MG/1
25 TABLET, EXTENDED RELEASE ORAL DAILY
COMMUNITY

## 2025-06-17 RX ORDER — VENLAFAXINE 37.5 MG/1
37.5 TABLET ORAL 2 TIMES DAILY
COMMUNITY

## 2025-06-17 RX ORDER — ONDANSETRON 8 MG/1
8 TABLET, FILM COATED ORAL EVERY 8 HOURS PRN
COMMUNITY

## 2025-06-17 RX ORDER — ASPIRIN 81 MG/1
81 TABLET ORAL DAILY
Qty: 30 TABLET | Refills: 1 | Status: SHIPPED | OUTPATIENT
Start: 2025-06-17

## 2025-06-17 NOTE — PROGRESS NOTES
Jonathan Davidson DO  Sandra Momin  1955 06/17/2025    Patient Active Problem List   Diagnosis    Ingrown toenail of right foot    Low back pain    DDD (degenerative disc disease), lumbar    Chronic pain syndrome    Polyarthralgia    Substance use    Anxiety and depression    Preoperative clearance    Encounter for screening for malignant neoplasm of colon    Gastric wall thickening    Lower abdominal pain       Dear :    Subjective     Sandra Momin is a 69 y.o. female with the problems as listed above, presents    Chief complaint: Intermittent chest pains and recent finding of moderate coronary artery calcification on the CT chest.    History of Present Illness: Ms. Sandra Momin is a pleasant 69-year-old  female with no previous history of known significant coronary artery disease, was noted to have moderate coronary artery calcifications on recent CT scan of the chest that was done to screen for lung cancer.  And she is referred to us now for further cardiac evaluation management.      On further questioning Ms. Momin states that she has intermittent chest pains that occur on the left and right side of her chest at random and are of moderate intensity and associated with some shortness of breath.  They are felt as sharp to dull pains.  She has been having these chest pains on and off for the last several months.  She has dyspnea with moderate exertion with no PND, orthopnea or pedal edema.      She has history of hypertension, patient is nondiabetic.  She has history of smoking for about 10 cigarettes a day and has smoked since childhood.  She just quit February 2024.  She currently vapes.  She does have positive family history of premature coronary artery disease with her dad reportedly having had stroke in his early 60s.        CT Chest Low Dose Cancer Screening     FINDINGS:  Lungs and pleural spaces:  Tiny sub-3 mm nodules both lungs.  4.1 mm  nodule right middle lobe,  image #87.  No consolidation.  No  pneumothorax.  No significant effusion.  Heart:  Moderate coronary artery calcifications.  No cardiomegaly.  No  significant pericardial effusion.    Bones/joints:  Degenerative changes thoracic spine. No acute bony  findings identified.  No dislocation.    Soft tissues:  Surgical clips in the left breast and axillary region.    Vasculature:  See above.    Lymph nodes:  Unremarkable.  No enlarged lymph nodes.     IMPRESSION:  1.  4.1 mm nodule right middle lobe, image #87.  2.  Moderate coronary artery calcifications.     ASSESSMENT:    Lung-RADS score: 2 - Benign Appearance or Behavior.  Recommend  continued annual screening with a low-dose CT (LDCT) in 12 months.     This report was finalized on 5/12/2025 2:47 PM by Dr. Shola Monterroso MD.      Allergies   Allergen Reactions    Penicillins Anaphylaxis    Sulfa Antibiotics Rash   :    Current Outpatient Medications:     amLODIPine (NORVASC) 5 MG tablet, TAKE 1 (ONE) TABLET AT BEDTIME FOR BLOOD PRESSURE, Disp: , Rfl:     cyclobenzaprine (FLEXERIL) 10 MG tablet, cyclobenzaprine 10 mg tablet, Disp: , Rfl:     diazePAM (VALIUM) 10 MG tablet, Take 1 tablet by mouth 2 (Two) Times a Day As Needed for Anxiety., Disp: , Rfl:     ergocalciferol (ERGOCALCIFEROL) 1.25 MG (64348 UT) capsule, ergocalciferol (vitamin D2) 1,250 mcg (50,000 unit) capsule, Disp: , Rfl:     metoprolol succinate XL (TOPROL-XL) 25 MG 24 hr tablet, Take 1 tablet by mouth Daily., Disp: , Rfl:     omeprazole (priLOSEC) 40 MG capsule, Take 1 capsule by mouth 2 (Two) Times a Day., Disp: 60 capsule, Rfl: 5    ondansetron (ZOFRAN) 8 MG tablet, Take 1 tablet by mouth Every 8 (Eight) Hours As Needed for Nausea or Vomiting., Disp: , Rfl:     oxyCODONE-acetaminophen (PERCOCET) 7.5-325 MG per tablet, Take 1 tablet by mouth Every 6 (Six) Hours As Needed., Disp: , Rfl:     venlafaxine (EFFEXOR) 37.5 MG tablet, Take 1 tablet by mouth 2 (Two) Times a Day., Disp: , Rfl:     aspirin 81  MG EC tablet, Take 1 tablet by mouth Daily., Disp: 30 tablet, Rfl: 1    Past Medical History:   Diagnosis Date    Anemia     Arthritis     Breast cancer 2003    Elevated cholesterol     GERD (gastroesophageal reflux disease)     Headache     Hearing loss     Hyperlipidemia     Hypertension     Low back pain      Past Surgical History:   Procedure Laterality Date    BREAST RECONSTRUCTION Left      SECTION      X 1    COLONOSCOPY N/A 2021    Procedure: COLONOSCOPY FOR SCREENING;  Surgeon: Candelario Fierro MD;  Location: [facility] OR;  Service: Gastroenterology;  Laterality: N/A;    COLONOSCOPY N/A 2023    Procedure: COLONOSCOPY FOR SCREENING;  Surgeon: Kalie Mcmullen MD;  Location: [facility] OR;  Service: Gastroenterology;  Laterality: N/A;    ENDOSCOPY N/A 2023    Procedure: ESOPHAGOGASTRODUODENOSCOPY WITH BIOPSY;  Surgeon: Kalie Mcmullen MD;  Location: [facility] OR;  Service: Gastroenterology;  Laterality: N/A;    FOOT SURGERY Bilateral     X 6    GALLBLADDER SURGERY      HERNIA REPAIR      HYSTERECTOMY  2000    benign    LUMBAR SYMPATHETIC NERVE BLOCK      X 2    MASTECTOMY Left 2003    REPLACEMENT TOTAL KNEE Right     SPINAL CORD STIMULATOR IMPLANT      SPINAL CORD STIMULATOR REMOVAL  2019     Family History   Problem Relation Age of Onset    Breast cancer Mother     Cancer Mother     Hypertension Mother     Heart disease Father     Hypertension Father     Breast cancer Daughter 46    BRCA 1/2 Daughter     Breast cancer Maternal Grandmother     Breast cancer Maternal Aunt     Breast cancer Maternal Aunt     Breast cancer Maternal Aunt     Breast cancer Paternal Aunt      Social History     Tobacco Use    Smoking status: Every Day     Current packs/day: 0.50     Average packs/day: 0.5 packs/day for 55.0 years (27.5 ttl pk-yrs)     Types: Cigarettes   Vaping Use    Vaping status: Never Used   Substance Use Topics    Alcohol use: Not Currently    Drug use: Not  "Currently     Review of Systems   Constitutional: Positive for malaise/fatigue. Negative for chills, fever, weight gain and weight loss.   HENT:  Positive for hearing loss. Negative for congestion and nosebleeds.    Cardiovascular:  Positive for chest pain. Negative for irregular heartbeat, leg swelling and orthopnea.   Respiratory:  Positive for shortness of breath. Negative for cough and hemoptysis.    Gastrointestinal:  Negative for abdominal pain, change in bowel habit, hematemesis, melena and vomiting.   Genitourinary:  Negative for dysuria, frequency and hematuria.   Neurological:  Negative for focal weakness, headaches, light-headedness and weakness.     Objective   Blood pressure 133/80, pulse 64, height 167.6 cm (66\"), weight 88.5 kg (195 lb), SpO2 97%.  Body mass index is 31.47 kg/m².    Vitals reviewed.   Constitutional:       Appearance: Well-developed.   Eyes:      Conjunctiva/sclera: Conjunctivae normal.   HENT:      Head: Normocephalic.   Neck:      Thyroid: No thyromegaly.      Vascular: No JVD.      Trachea: No tracheal deviation.   Pulmonary:      Effort: No respiratory distress.      Breath sounds: Normal breath sounds. No wheezing. No rales.   Cardiovascular:      PMI at left midclavicular line. Normal rate. Regular rhythm. Normal S1. Normal S2.       Murmurs: There is no murmur.      No gallop.  No click. No rub.   Pulses:     Carotid: 2+ bilaterally.     Radial: 2+ bilaterally.     Dorsalis pedis: 1+ bilaterally.     Posterior tibial: 1+ bilaterally.  Edema:     Peripheral edema absent.   Abdominal:      General: Bowel sounds are normal.      Palpations: Abdomen is soft. There is no abdominal mass.      Tenderness: There is no abdominal tenderness.   Musculoskeletal:      Cervical back: Normal range of motion and neck supple. Skin:     General: Skin is warm and dry.   Neurological:      Mental Status: Alert and oriented to person, place, and time.      Cranial Nerves: No cranial nerve deficit. "         ECG 12 Lead    Date/Time: 6/17/2025 9:00 AM  Performed by: Santy Dhaliwal MD    Authorized by: Santy Dhaliwal MD  Comparison: compared with previous ECG from 6/29/2022  Similar to previous ECG  Rhythm: sinus rhythm  Conduction: conduction normal  ST Segments: ST segments normal  T Waves: T waves normal  Comments: QTc: 433 ms        Assessment & Plan    Diagnosis Plan   1. Precordial pain  ECG 12 Lead    Stress Test With Myocardial Perfusion (1 Day)      2. Coronary artery calcification seen on CT scan  Stress Test With Myocardial Perfusion (1 Day)      3. Dyspnea on exertion  Adult Transthoracic Echo Complete w/ Color, Spectral and Contrast if necessary per protocol      4. Essential hypertension            Recommendations  Orders Placed This Encounter   Procedures    Stress Test With Myocardial Perfusion (1 Day)    ECG 12 Lead    Adult Transthoracic Echo Complete w/ Color, Spectral and Contrast if necessary per protocol        Will add enteric-coated aspirin 81 mg daily  Will evaluate her chest pains further with Lexiscan sestamibi study.  Will evaluate her dyspnea with an echo Doppler study    Return in about 4 weeks (around 7/15/2025).    As always,   I appreciate very much the opportunity to participate in the cardiovascular care of your patients. Please do not hesitate to call me with any questions with regards to Sandra Kiera Momin's evaluation and management.     With Best Regards,        Santy Dhaliwal MD, [facility]    Please note that portions of this note were completed with a voice recognition program.

## 2025-06-17 NOTE — PROGRESS NOTES
A [employer] message has been sent to the patient for patient rounding for [facility].

## 2025-06-17 NOTE — LETTER
June 21, 2025     Jonathan Davidson DO  [address] [facility] [place] [address]    Patient: Sandra Momin   YOB: 1955   Date of Visit: 6/17/2025     Dear Jonathan Davidson DO:       Thank you for referring Sandra Momin to me for evaluation. Below are the relevant portions of my assessment and plan of care.    If you have questions, please do not hesitate to call me. I look forward to following Sandra along with you.         Sincerely,        Santy Dhaliwal MD        CC: No Recipients    Santy Dhaliwal MD  06/21/25 [id number]  Sign when Signing Visit  Jonathan Davidson DO  Sandra Momin  1955 06/17/2025    Patient Active Problem List   Diagnosis   • Ingrown toenail of right foot   • Low back pain   • DDD (degenerative disc disease), lumbar   • Chronic pain syndrome   • Polyarthralgia   • Substance use   • Anxiety and depression   • Preoperative clearance   • Encounter for screening for malignant neoplasm of colon   • Gastric wall thickening   • Lower abdominal pain       Dear :    Subjective     Sandra Momin is a 69 y.o. female with the problems as listed above, presents    Chief complaint: Intermittent chest pains and recent finding of moderate coronary artery calcification on the CT chest.    History of Present Illness: Ms. Sandra Momin is a pleasant 69-year-old  female with no previous history of known significant coronary artery disease, was noted to have moderate coronary artery calcifications on recent CT scan of the chest that was done to screen for lung cancer.  And she is referred to us now for further cardiac evaluation management.      On further questioning Ms. Momin states that she has intermittent chest pains that occur on the left and right side of her chest at random and are of moderate intensity and associated with some shortness of breath.  They are felt as sharp to dull pains.  She has been having these chest pains on and off for the  last several months.  She has dyspnea with moderate exertion with no PND, orthopnea or pedal edema.      She has history of hypertension, patient is nondiabetic.  She has history of smoking for about 10 cigarettes a day and has smoked since childhood.  She just quit February 2024.  She currently vapes.  She does have positive family history of premature coronary artery disease with her dad reportedly having had stroke in his early 60s.        CT Chest Low Dose Cancer Screening     FINDINGS:  Lungs and pleural spaces:  Tiny sub-3 mm nodules both lungs.  4.1 mm  nodule right middle lobe, image #87.  No consolidation.  No  pneumothorax.  No significant effusion.  Heart:  Moderate coronary artery calcifications.  No cardiomegaly.  No  significant pericardial effusion.    Bones/joints:  Degenerative changes thoracic spine. No acute bony  findings identified.  No dislocation.    Soft tissues:  Surgical clips in the left breast and axillary region.    Vasculature:  See above.    Lymph nodes:  Unremarkable.  No enlarged lymph nodes.     IMPRESSION:  1.  4.1 mm nodule right middle lobe, image #87.  2.  Moderate coronary artery calcifications.     ASSESSMENT:    Lung-RADS score: 2 - Benign Appearance or Behavior.  Recommend  continued annual screening with a low-dose CT (LDCT) in 12 months.     This report was finalized on 5/12/2025 2:47 PM by Dr. Shola Monterroso MD.      Allergies   Allergen Reactions   • Penicillins Anaphylaxis   • Sulfa Antibiotics Rash   :    Current Outpatient Medications:   •  amLODIPine (NORVASC) 5 MG tablet, TAKE 1 (ONE) TABLET AT BEDTIME FOR BLOOD PRESSURE, Disp: , Rfl:   •  cyclobenzaprine (FLEXERIL) 10 MG tablet, cyclobenzaprine 10 mg tablet, Disp: , Rfl:   •  diazePAM (VALIUM) 10 MG tablet, Take 1 tablet by mouth 2 (Two) Times a Day As Needed for Anxiety., Disp: , Rfl:   •  ergocalciferol (ERGOCALCIFEROL) 1.25 MG (19523 UT) capsule, ergocalciferol (vitamin D2) 1,250 mcg (50,000 unit) capsule, Disp:  , Rfl:   •  metoprolol succinate XL (TOPROL-XL) 25 MG 24 hr tablet, Take 1 tablet by mouth Daily., Disp: , Rfl:   •  omeprazole (priLOSEC) 40 MG capsule, Take 1 capsule by mouth 2 (Two) Times a Day., Disp: 60 capsule, Rfl: 5  •  ondansetron (ZOFRAN) 8 MG tablet, Take 1 tablet by mouth Every 8 (Eight) Hours As Needed for Nausea or Vomiting., Disp: , Rfl:   •  oxyCODONE-acetaminophen (PERCOCET) 7.5-325 MG per tablet, Take 1 tablet by mouth Every 6 (Six) Hours As Needed., Disp: , Rfl:   •  venlafaxine (EFFEXOR) 37.5 MG tablet, Take 1 tablet by mouth 2 (Two) Times a Day., Disp: , Rfl:   •  aspirin 81 MG EC tablet, Take 1 tablet by mouth Daily., Disp: 30 tablet, Rfl: 1    Past Medical History:   Diagnosis Date   • Anemia    • Arthritis    • Breast cancer    • Elevated cholesterol    • GERD (gastroesophageal reflux disease)    • Headache    • Hearing loss    • Hyperlipidemia    • Hypertension    • Low back pain      Past Surgical History:   Procedure Laterality Date   • BREAST RECONSTRUCTION Left    •  SECTION      X 1   • COLONOSCOPY N/A 2021    Procedure: COLONOSCOPY FOR SCREENING;  Surgeon: Candelario Fierro MD;  Location: [facility];  Service: Gastroenterology;  Laterality: N/A;   • COLONOSCOPY N/A 2023    Procedure: COLONOSCOPY FOR SCREENING;  Surgeon: Kalie Mcmullen MD;  Location: [facility] OR;  Service: Gastroenterology;  Laterality: N/A;   • ENDOSCOPY N/A 2023    Procedure: ESOPHAGOGASTRODUODENOSCOPY WITH BIOPSY;  Surgeon: Kalie Mcmullen MD;  Location: [facility] OR;  Service: Gastroenterology;  Laterality: N/A;   • FOOT SURGERY Bilateral     X 6   • GALLBLADDER SURGERY     • HERNIA REPAIR     • HYSTERECTOMY      benign   • LUMBAR SYMPATHETIC NERVE BLOCK      X 2   • MASTECTOMY Left    • REPLACEMENT TOTAL KNEE Right    • SPINAL CORD STIMULATOR IMPLANT     • SPINAL CORD STIMULATOR REMOVAL       Family History   Problem Relation Age of Onset   •  "Breast cancer Mother    • Cancer Mother    • Hypertension Mother    • Heart disease Father    • Hypertension Father    • Breast cancer Daughter 46   • BRCA 1/2 Daughter    • Breast cancer Maternal Grandmother    • Breast cancer Maternal Aunt    • Breast cancer Maternal Aunt    • Breast cancer Maternal Aunt    • Breast cancer Paternal Aunt      Social History     Tobacco Use   • Smoking status: Every Day     Current packs/day: 0.50     Average packs/day: 0.5 packs/day for 55.0 years (27.5 ttl pk-yrs)     Types: Cigarettes   Vaping Use   • Vaping status: Never Used   Substance Use Topics   • Alcohol use: Not Currently   • Drug use: Not Currently     Review of Systems   Constitutional: Positive for malaise/fatigue. Negative for chills, fever, weight gain and weight loss.   HENT:  Positive for hearing loss. Negative for congestion and nosebleeds.    Cardiovascular:  Positive for chest pain. Negative for irregular heartbeat, leg swelling and orthopnea.   Respiratory:  Positive for shortness of breath. Negative for cough and hemoptysis.    Gastrointestinal:  Negative for abdominal pain, change in bowel habit, hematemesis, melena and vomiting.   Genitourinary:  Negative for dysuria, frequency and hematuria.   Neurological:  Negative for focal weakness, headaches, light-headedness and weakness.     Objective   Blood pressure 133/80, pulse 64, height 167.6 cm (66\"), weight 88.5 kg (195 lb), SpO2 97%.  Body mass index is 31.47 kg/m².    Vitals reviewed.   Constitutional:       Appearance: Well-developed.   Eyes:      Conjunctiva/sclera: Conjunctivae normal.   HENT:      Head: Normocephalic.   Neck:      Thyroid: No thyromegaly.      Vascular: No JVD.      Trachea: No tracheal deviation.   Pulmonary:      Effort: No respiratory distress.      Breath sounds: Normal breath sounds. No wheezing. No rales.   Cardiovascular:      PMI at left midclavicular line. Normal rate. Regular rhythm. Normal S1. Normal S2.       Murmurs: There " is no murmur.      No gallop.  No click. No rub.   Pulses:     Carotid: 2+ bilaterally.     Radial: 2+ bilaterally.     Dorsalis pedis: 1+ bilaterally.     Posterior tibial: 1+ bilaterally.  Edema:     Peripheral edema absent.   Abdominal:      General: Bowel sounds are normal.      Palpations: Abdomen is soft. There is no abdominal mass.      Tenderness: There is no abdominal tenderness.   Musculoskeletal:      Cervical back: Normal range of motion and neck supple. Skin:     General: Skin is warm and dry.   Neurological:      Mental Status: Alert and oriented to person, place, and time.      Cranial Nerves: No cranial nerve deficit.         ECG 12 Lead    Date/Time: 6/17/2025 9:00 AM  Performed by: Santy Dhaliwal MD    Authorized by: Santy Dhaliwal MD  Comparison: compared with previous ECG from 6/29/2022  Similar to previous ECG  Rhythm: sinus rhythm  Conduction: conduction normal  ST Segments: ST segments normal  T Waves: T waves normal  Comments: QTc: 433 ms        Assessment & Plan    Diagnosis Plan   1. Precordial pain  ECG 12 Lead    Stress Test With Myocardial Perfusion (1 Day)      2. Coronary artery calcification seen on CT scan  Stress Test With Myocardial Perfusion (1 Day)      3. Dyspnea on exertion  Adult Transthoracic Echo Complete w/ Color, Spectral and Contrast if necessary per protocol      4. Essential hypertension            Recommendations  Orders Placed This Encounter   Procedures   • Stress Test With Myocardial Perfusion (1 Day)   • ECG 12 Lead   • Adult Transthoracic Echo Complete w/ Color, Spectral and Contrast if necessary per protocol        Will add enteric-coated aspirin 81 mg daily  Will evaluate her chest pains further with Lexiscan sestamibi study.  Will evaluate her dyspnea with an echo Doppler study    Return in about 4 weeks (around 7/15/2025).    As always,   I appreciate very much the opportunity to participate in the cardiovascular care of your patients. Please do  not hesitate to call me with any questions with regards to Sandra Momin's evaluation and management.     With Best Regards,        Santy Dhaliwal MD, FACC    Please note that portions of this note were completed with a voice recognition program.

## 2025-06-24 ENCOUNTER — HOSPITAL ENCOUNTER (OUTPATIENT)
Dept: CARDIOLOGY | Facility: HOSPITAL | Age: 70
Discharge: HOME | End: 2025-06-24
Payer: COMMERCIAL

## 2025-06-24 ENCOUNTER — HOSPITAL ENCOUNTER (OUTPATIENT)
Dept: NUCLEAR MEDICINE | Facility: HOSPITAL | Age: 70
Discharge: HOME | End: 2025-06-24
Payer: COMMERCIAL

## 2025-06-24 DIAGNOSIS — I25.10 CORONARY ARTERY CALCIFICATION SEEN ON CT SCAN: ICD-10-CM

## 2025-06-24 DIAGNOSIS — R06.09 DYSPNEA ON EXERTION: ICD-10-CM

## 2025-06-24 DIAGNOSIS — R07.2 PRECORDIAL PAIN: ICD-10-CM

## 2025-06-24 LAB
BH CV NUCLEAR PRIOR STUDY: 3
BH CV REST NUCLEAR ISOTOPE DOSE: 10.4 MCI
BH CV STRESS BP STAGE 1: NORMAL
BH CV STRESS COMMENTS STAGE 1: NORMAL
BH CV STRESS DOSE REGADENOSON STAGE 1: 0.4
BH CV STRESS DURATION MIN STAGE 1: 0
BH CV STRESS DURATION SEC STAGE 1: 10
BH CV STRESS HR STAGE 1: 74
BH CV STRESS NUCLEAR ISOTOPE DOSE: 30.4 MCI
BH CV STRESS PROTOCOL 1: NORMAL
BH CV STRESS RECOVERY BP: NORMAL MMHG
BH CV STRESS RECOVERY HR: 80 BPM
BH CV STRESS STAGE 1: 1
MAXIMAL PREDICTED HEART RATE: 151 BPM
PERCENT MAX PREDICTED HR: 49.01 %
STRESS BASELINE BP: NORMAL MMHG
STRESS BASELINE HR: 56 BPM
STRESS PERCENT HR: 58 %
STRESS POST PEAK BP: NORMAL MMHG
STRESS POST PEAK HR: 74 BPM
STRESS TARGET HR: 128 BPM

## 2025-06-24 PROCEDURE — 25010000002 REGADENOSON 0.4 MG/5ML SOLUTION: Performed by: INTERNAL MEDICINE

## 2025-06-24 PROCEDURE — 34310000005 TECHNETIUM SESTAMIBI: Performed by: INTERNAL MEDICINE

## 2025-06-24 PROCEDURE — 78452 HT MUSCLE IMAGE SPECT MULT: CPT

## 2025-06-24 PROCEDURE — A9500 TC99M SESTAMIBI: HCPCS | Performed by: INTERNAL MEDICINE

## 2025-06-24 PROCEDURE — 93306 TTE W/DOPPLER COMPLETE: CPT

## 2025-06-24 PROCEDURE — 93018 CV STRESS TEST I&R ONLY: CPT | Performed by: INTERNAL MEDICINE

## 2025-06-24 PROCEDURE — 78452 HT MUSCLE IMAGE SPECT MULT: CPT | Performed by: INTERNAL MEDICINE

## 2025-06-24 PROCEDURE — 93017 CV STRESS TEST TRACING ONLY: CPT

## 2025-06-24 RX ORDER — REGADENOSON 0.08 MG/ML
0.4 INJECTION, SOLUTION INTRAVENOUS
Status: COMPLETED | OUTPATIENT
Start: 2025-06-24 | End: 2025-06-24

## 2025-06-24 RX ADMIN — TECHNETIUM TC 99M SESTAMIBI 1 DOSE: 1 INJECTION INTRAVENOUS at 08:20

## 2025-06-24 RX ADMIN — TECHNETIUM TC 99M SESTAMIBI 1 DOSE: 1 INJECTION INTRAVENOUS at 09:30

## 2025-06-24 RX ADMIN — REGADENOSON 0.4 MG: 0.08 INJECTION, SOLUTION INTRAVENOUS at 09:29

## 2025-06-25 ENCOUNTER — TELEPHONE (OUTPATIENT)
Dept: CARDIOLOGY | Facility: CLINIC | Age: 70
End: 2025-06-25
Payer: COMMERCIAL

## 2025-06-26 LAB
AORTIC DIMENSIONLESS INDEX: 0.75 (DI)
AV MEAN PRESS GRAD SYS DOP V1V2: 3 MMHG
AV VMAX SYS DOP: 110 CM/SEC
BH CV ECHO MEAS - ACS: 2.2 CM
BH CV ECHO MEAS - AO MAX PG: 4.8 MMHG
BH CV ECHO MEAS - AO ROOT DIAM: 3.3 CM
BH CV ECHO MEAS - AO V2 VTI: 24.4 CM
BH CV ECHO MEAS - AVA(I,D): 3.4 CM2
BH CV ECHO MEAS - EDV(CUBED): 73 ML
BH CV ECHO MEAS - EDV(MOD-SP4): 113 ML
BH CV ECHO MEAS - EF(MOD-SP4): 65 %
BH CV ECHO MEAS - ESV(MOD-SP4): 39.6 ML
BH CV ECHO MEAS - IVS/LVPW: 1.04 CM
BH CV ECHO MEAS - IVSD: 1.11 CM
BH CV ECHO MEAS - LA DIMENSION: 2.5 CM
BH CV ECHO MEAS - LAT PEAK E' VEL: 7.3 CM/SEC
BH CV ECHO MEAS - LV DIASTOLIC VOL/BSA (35-75): 57.1 CM2
BH CV ECHO MEAS - LV MASS(C)D: 153.9 GRAMS
BH CV ECHO MEAS - LV MAX PG: 3.2 MMHG
BH CV ECHO MEAS - LV MEAN PG: 1 MMHG
BH CV ECHO MEAS - LV SYSTOLIC VOL/BSA (12-30): 20 CM2
BH CV ECHO MEAS - LV V1 MAX: 89.1 CM/SEC
BH CV ECHO MEAS - LV V1 VTI: 18.4 CM
BH CV ECHO MEAS - LVIDD: 4.2 CM
BH CV ECHO MEAS - LVOT AREA: 4.5 CM2
BH CV ECHO MEAS - LVOT DIAM: 2.4 CM
BH CV ECHO MEAS - LVPWD: 1.07 CM
BH CV ECHO MEAS - MED PEAK E' VEL: 7.1 CM/SEC
BH CV ECHO MEAS - MV A MAX VEL: 95.5 CM/SEC
BH CV ECHO MEAS - MV DEC SLOPE: 444 CM/SEC2
BH CV ECHO MEAS - MV E MAX VEL: 102 CM/SEC
BH CV ECHO MEAS - MV E/A: 1.07
BH CV ECHO MEAS - MV P1/2T: 67.3 MSEC
BH CV ECHO MEAS - MVA(P1/2T): 3.3 CM2
BH CV ECHO MEAS - PA ACC TIME: 0.08 SEC
BH CV ECHO MEAS - PA V2 MAX: 80.1 CM/SEC
BH CV ECHO MEAS - RVDD: 2.6 CM
BH CV ECHO MEAS - SV(LVOT): 83.2 ML
BH CV ECHO MEAS - SV(MOD-SP4): 73.4 ML
BH CV ECHO MEAS - SVI(LVOT): 42.1 ML/M2
BH CV ECHO MEAS - SVI(MOD-SP4): 37.1 ML/M2
BH CV ECHO MEAS - TAPSE (>1.6): 2.19 CM
BH CV ECHO MEASUREMENTS AVERAGE E/E' RATIO: 14.17

## 2025-07-07 ENCOUNTER — OFFICE VISIT (OUTPATIENT)
Dept: CARDIOLOGY | Facility: CLINIC | Age: 70
End: 2025-07-07
Payer: COMMERCIAL

## 2025-07-07 VITALS
HEIGHT: 66 IN | DIASTOLIC BLOOD PRESSURE: 87 MMHG | WEIGHT: 194.2 LBS | RESPIRATION RATE: 16 BRPM | SYSTOLIC BLOOD PRESSURE: 130 MMHG | HEART RATE: 71 BPM | BODY MASS INDEX: 31.21 KG/M2 | OXYGEN SATURATION: 99 %

## 2025-07-07 DIAGNOSIS — I10 ESSENTIAL HYPERTENSION: Primary | ICD-10-CM

## 2025-07-07 DIAGNOSIS — I25.10 CORONARY ARTERY CALCIFICATION SEEN ON CT SCAN: ICD-10-CM

## 2025-07-07 PROCEDURE — 99214 OFFICE O/P EST MOD 30 MIN: CPT | Performed by: PHYSICIAN ASSISTANT

## 2025-07-07 RX ORDER — ROSUVASTATIN CALCIUM 10 MG/1
10 TABLET, COATED ORAL DAILY
Qty: 30 TABLET | Refills: 3 | Status: SHIPPED | OUTPATIENT
Start: 2025-07-07

## 2025-07-07 NOTE — PROGRESS NOTES
Jonathan Davidson DO  Sandra Momin  1955 07/07/2025    Patient Active Problem List   Diagnosis    Ingrown toenail of right foot    Low back pain    DDD (degenerative disc disease), lumbar    Chronic pain syndrome    Polyarthralgia    Substance use    Anxiety and depression    Preoperative clearance    Encounter for screening for malignant neoplasm of colon    Gastric wall thickening    Lower abdominal pain    Coronary artery calcification seen on CT scan    Essential hypertension       Dear Jonathan Davidson, DO:    Subjective     History of Present Illness:    Chief Complaint   Patient presents with    Follow-up     Follow Up after Stress and Echo       Sandra Momin is a pleasant 69 y.o. female with a past medical history significant for tobacco abuse at the age of 16 and she does have some family history of coronary artery disease with her father reportedly being found with coronary artery disease later in life unsure of the exact age.  She herself denies any personal history of diabetes mellitus.  She does have essential hypertension and dyslipidemia.    Sandra comes in today following recent stress test which did show small to moderate size mild degree of ischemia in the apex and lateral wall consistent with a intermediate risk study.  She does still report having regular chest pains that she describes as a sharp pain.  She reports that these typically occur 2-3 times a week and will typically last less than a minute in duration.  She reports that twisting can exacerbate this pain and cause it to come on.    Allergies   Allergen Reactions    Penicillins Anaphylaxis    Sulbactam Other (See Comments) and Unknown - High Severity    Sulfa Antibiotics Rash   :      Current Outpatient Medications:     amLODIPine (NORVASC) 5 MG tablet, TAKE 1 (ONE) TABLET AT BEDTIME FOR BLOOD PRESSURE, Disp: , Rfl:     aspirin 81 MG EC tablet, Take 1 tablet by mouth Daily., Disp: 30 tablet, Rfl: 1     "cyclobenzaprine (FLEXERIL) 10 MG tablet, cyclobenzaprine 10 mg tablet, Disp: , Rfl:     diazePAM (VALIUM) 10 MG tablet, Take 1 tablet by mouth 2 (Two) Times a Day As Needed for Anxiety., Disp: , Rfl:     ergocalciferol (ERGOCALCIFEROL) 1.25 MG (83968 UT) capsule, ergocalciferol (vitamin D2) 1,250 mcg (50,000 unit) capsule, Disp: , Rfl:     metoprolol succinate XL (TOPROL-XL) 25 MG 24 hr tablet, Take 1 tablet by mouth Daily., Disp: , Rfl:     omeprazole (priLOSEC) 40 MG capsule, Take 1 capsule by mouth 2 (Two) Times a Day., Disp: 60 capsule, Rfl: 5    ondansetron (ZOFRAN) 8 MG tablet, Take 1 tablet by mouth Every 8 (Eight) Hours As Needed for Nausea or Vomiting., Disp: , Rfl:     oxyCODONE-acetaminophen (PERCOCET) 7.5-325 MG per tablet, Take 1 tablet by mouth Every 6 (Six) Hours As Needed., Disp: , Rfl:     venlafaxine (EFFEXOR) 37.5 MG tablet, Take 1 tablet by mouth 2 (Two) Times a Day., Disp: , Rfl:     rosuvastatin (CRESTOR) 10 MG tablet, Take 1 tablet by mouth Daily., Disp: 30 tablet, Rfl: 3    The following portions of the patient's history were reviewed and updated as appropriate: allergies, current medications, past family history, past medical history, past social history, past surgical history and problem list.    Social History     Tobacco Use    Smoking status: Every Day     Current packs/day: 0.50     Average packs/day: 0.5 packs/day for 55.0 years (27.5 ttl pk-yrs)     Types: Cigarettes   Vaping Use    Vaping status: Never Used   Substance Use Topics    Alcohol use: Not Currently    Drug use: Not Currently         Objective   Vitals:    07/07/25 1319   BP: 130/87   BP Location: Right arm   Patient Position: Sitting   Cuff Size: Adult   Pulse: 71   Resp: 16   SpO2: 99%   Weight: 88.1 kg (194 lb 3.2 oz)   Height: 167.6 cm (65.98\")     Body mass index is 31.36 kg/m².    ROS    Constitutional:       General: Not in acute distress.     Appearance: Healthy appearance. Well-developed and not in distress. Not " "diaphoretic.   Eyes:      Conjunctiva/sclera: Conjunctivae normal.      Pupils: Pupils are equal, round, and reactive to light.   HENT:      Head: Normocephalic and atraumatic.   Neck:      Vascular: No carotid bruit or JVD.   Pulmonary:      Effort: Pulmonary effort is normal. No respiratory distress.      Breath sounds: Normal breath sounds.   Cardiovascular:      Normal rate. Regular rhythm.   Edema:     Peripheral edema absent.   Skin:     General: Skin is cool.   Neurological:      Mental Status: Alert, oriented to person, place, and time and oriented to person, place and time.         Lab Results   Component Value Date     06/29/2022    K 4.0 06/29/2022     06/29/2022    CO2 24.7 06/29/2022    BUN 17 06/29/2022    CREATININE 1.30 05/12/2025    GLUCOSE 94 06/29/2022    CALCIUM 9.4 06/29/2022    AST 14 06/29/2022    ALT 9 06/29/2022    ALKPHOS 93 06/29/2022     No results found for: \"CKTOTAL\"  Lab Results   Component Value Date    WBC 8.52 06/29/2022    HGB 13.1 06/29/2022    HCT 39.8 06/29/2022     06/29/2022     No results found for: \"INR\"  No results found for: \"MG\"  Lab Results   Component Value Date    CHLPL 194 08/21/2015    TRIG 103 08/21/2015    HDL 38 (L) 08/21/2015     (H) 08/21/2015      No results found for: \"BNP\"    During this visit the following were done:  Labs Reviewed []    Labs Ordered []    Radiology Reports Reviewed []    Radiology Ordered []    PCP Records Reviewed []    Referring Provider Records Reviewed []    ER Records Reviewed []    Hospital Records Reviewed []    History Obtained From Family []    Radiology Images Reviewed []    Other Reviewed []    Records Requested []       Procedures    Assessment & Plan    Diagnosis Plan   1. Essential hypertension  Comprehensive Metabolic Panel    Lipid Panel      2. Coronary artery calcification seen on CT scan                 Recommendations:  Coronary artery disease  Will begin GDMT will start Crestor 10 mg.  Continue " aspirin and metoprolol  Current symptoms are very atypical more likely musculoskeletal in origin as they are worse with twisting and range of motion and not exacerbated with physical exertion.  Given this I think continuing GDMT is most reasonable.  Essential hypertension  Reasonably well-controlled today we will continue to monitor  Tobacco abuse  She did quit cigarettes now on a vape encouraged all cessation    No follow-ups on file.    As always, I appreciate very much the opportunity to participate in the cardiovascular care of your patients.      With Best Regards,    Clement Ray PA-C

## 2025-07-17 ENCOUNTER — TELEPHONE (OUTPATIENT)
Dept: CARDIOLOGY | Facility: CLINIC | Age: 70
End: 2025-07-17
Payer: COMMERCIAL

## 2025-07-17 RX ORDER — ATORVASTATIN CALCIUM 10 MG/1
10 TABLET, FILM COATED ORAL DAILY
Qty: 30 TABLET | Refills: 5 | Status: SHIPPED | OUTPATIENT
Start: 2025-07-17 | End: 2025-08-16

## 2025-07-17 NOTE — TELEPHONE ENCOUNTER
Okay she can stop it if she is willing to try another statin I sent in Lipitor 10 mg which is not as potent as Crestor

## 2025-07-17 NOTE — TELEPHONE ENCOUNTER
Name: Sandra Qureshi Merrill      Relationship: SELF      Best Callback Number: [phone]        HUB PROVIDED THE RELAY MESSAGE FROM THE OFFICE      PATIENT: VOICED UNDERSTANDING AND HAS NO FURTHER QUESTIONS AT THIS TIME    ADDITIONAL INFORMATION:

## 2025-07-17 NOTE — TELEPHONE ENCOUNTER
HUB TO RELAY    Called to relay Clement's message-No answer-VM full.    Okay she can stop it if she is willing to try another statin I sent in Lipitor 10 mg which is not as potent as Crestor

## 2025-07-17 NOTE — TELEPHONE ENCOUNTER
Patient has been having  bad muscle aches and cramps since starting Crestor.  She  did not take one today.  She also says she almost falls over when she standup due to cramps in her legs.    Please advise

## 2025-08-04 DIAGNOSIS — I10 ESSENTIAL HYPERTENSION: ICD-10-CM

## 2025-08-04 DIAGNOSIS — R07.2 PRECORDIAL PAIN: Primary | ICD-10-CM

## 2025-08-04 DIAGNOSIS — I25.10 CORONARY ARTERY CALCIFICATION SEEN ON CT SCAN: ICD-10-CM

## 2025-08-04 RX ORDER — SODIUM CHLORIDE 0.9 % (FLUSH) 0.9 %
10 SYRINGE (ML) INJECTION EVERY 12 HOURS SCHEDULED
OUTPATIENT
Start: 2025-08-04

## 2025-08-04 RX ORDER — SODIUM CHLORIDE 0.9 % (FLUSH) 0.9 %
10 SYRINGE (ML) INJECTION AS NEEDED
OUTPATIENT
Start: 2025-08-04

## 2025-08-04 RX ORDER — NITROGLYCERIN 0.4 MG/1
0.4 TABLET SUBLINGUAL
OUTPATIENT
Start: 2025-08-04 | End: 2025-08-04

## 2025-08-04 RX ORDER — METOPROLOL TARTRATE 50 MG
50 TABLET ORAL ONCE
Qty: 1 TABLET | Refills: 0 | Status: SHIPPED | OUTPATIENT
Start: 2025-08-04 | End: 2025-08-04

## 2025-08-04 RX ORDER — SODIUM CHLORIDE 9 MG/ML
40 INJECTION, SOLUTION INTRAVENOUS AS NEEDED
OUTPATIENT
Start: 2025-08-04

## 2025-08-25 ENCOUNTER — HOSPITAL ENCOUNTER (OUTPATIENT)
Dept: CT IMAGING | Facility: HOSPITAL | Age: 70
Discharge: HOME | End: 2025-08-25
Admitting: PHYSICIAN ASSISTANT
Payer: COMMERCIAL

## 2025-08-25 VITALS — HEART RATE: 65 BPM | SYSTOLIC BLOOD PRESSURE: 138 MMHG | DIASTOLIC BLOOD PRESSURE: 82 MMHG

## 2025-08-25 DIAGNOSIS — I25.10 CORONARY ARTERY CALCIFICATION SEEN ON CT SCAN: ICD-10-CM

## 2025-08-25 DIAGNOSIS — I10 ESSENTIAL HYPERTENSION: ICD-10-CM

## 2025-08-25 DIAGNOSIS — R07.2 PRECORDIAL PAIN: ICD-10-CM

## 2025-08-25 PROCEDURE — 75574 CT ANGIO HRT W/3D IMAGE: CPT

## 2025-08-25 PROCEDURE — 36410 VNPNXR 3YR/> PHY/QHP DX/THER: CPT

## 2025-08-25 PROCEDURE — 25510000001 IOPAMIDOL PER 1 ML: Performed by: PHYSICIAN ASSISTANT

## 2025-08-25 RX ORDER — IOPAMIDOL 755 MG/ML
100 INJECTION, SOLUTION INTRAVASCULAR
Status: COMPLETED | OUTPATIENT
Start: 2025-08-25 | End: 2025-08-25

## 2025-08-25 RX ADMIN — IOPAMIDOL 100 ML: 755 INJECTION, SOLUTION INTRAVENOUS at 14:13

## 2025-08-26 ENCOUNTER — TELEPHONE (OUTPATIENT)
Dept: CARDIOLOGY | Facility: CLINIC | Age: 70
End: 2025-08-26
Payer: COMMERCIAL

## (undated) DEVICE — SYR LUERLOK 30CC

## (undated) DEVICE — CONN Y IRR DISP 1P/U

## (undated) DEVICE — GOWN,REINF,POLY,ECL,PP SLV,XL: Brand: MEDLINE

## (undated) DEVICE — FRCP BX RADJAW4 NDL 2.8 240CM LG OG BX40

## (undated) DEVICE — SUCTION CANISTER, 1500CC, RIGID: Brand: DEROYAL

## (undated) DEVICE — POLYP TRAP: Brand: TRAPEASE®

## (undated) DEVICE — Device

## (undated) DEVICE — THE EXACTO COLD SNARE IS INTENDED TO BE USED WITHOUT DIATHERMIC ENERGY FOR THE ENDOSCOPIC RESECTION OF POLYP TISSUE IN THE GASTROINTESTINAL TRACT.: Brand: EXACTO

## (undated) DEVICE — ENDOGATOR AUXILIARY WATER JET CONNECTOR: Brand: ENDOGATOR